# Patient Record
Sex: FEMALE | Race: WHITE | Employment: OTHER | URBAN - METROPOLITAN AREA
[De-identification: names, ages, dates, MRNs, and addresses within clinical notes are randomized per-mention and may not be internally consistent; named-entity substitution may affect disease eponyms.]

---

## 2018-04-07 PROBLEM — R19.01 RUQ ABDOMINAL MASS: Status: ACTIVE | Noted: 2018-04-07

## 2018-04-10 PROBLEM — R19.00 ABDOMINAL MASS: Status: ACTIVE | Noted: 2018-04-10

## 2018-04-10 RX ORDER — SODIUM CHLORIDE 0.9 % (FLUSH) 0.9 %
10 SYRINGE (ML) INJECTION EVERY 12 HOURS SCHEDULED
Status: CANCELLED | OUTPATIENT
Start: 2018-04-10

## 2018-04-10 RX ORDER — SODIUM CHLORIDE 0.9 % (FLUSH) 0.9 %
10 SYRINGE (ML) INJECTION PRN
Status: CANCELLED | OUTPATIENT
Start: 2018-04-10

## 2018-04-10 RX ORDER — SODIUM CHLORIDE, SODIUM LACTATE, POTASSIUM CHLORIDE, CALCIUM CHLORIDE 600; 310; 30; 20 MG/100ML; MG/100ML; MG/100ML; MG/100ML
INJECTION, SOLUTION INTRAVENOUS CONTINUOUS
Status: CANCELLED | OUTPATIENT
Start: 2018-04-10

## 2018-04-10 RX ORDER — LIDOCAINE HYDROCHLORIDE 10 MG/ML
0.3 INJECTION, SOLUTION EPIDURAL; INFILTRATION; INTRACAUDAL; PERINEURAL
Status: CANCELLED | OUTPATIENT
Start: 2018-04-10 | End: 2018-04-10

## 2018-04-11 ENCOUNTER — HOSPITAL ENCOUNTER (OUTPATIENT)
Dept: OTHER | Age: 73
Discharge: HOME | End: 2018-04-11
Attending: INTERNAL MEDICINE | Admitting: INTERNAL MEDICINE

## 2018-04-21 ENCOUNTER — TELEPHONE (OUTPATIENT)
Dept: INTERNAL MEDICINE | Age: 73
End: 2018-04-21

## 2018-04-24 ENCOUNTER — TELEPHONE (OUTPATIENT)
Dept: SURGERY | Age: 73
End: 2018-04-24

## 2018-04-25 ENCOUNTER — HOSPITAL ENCOUNTER (OUTPATIENT)
Dept: SURGERY | Age: 73
Discharge: HOME | End: 2018-04-25
Attending: SURGERY | Admitting: SURGERY

## 2018-04-25 VITALS
TEMPERATURE: 97.5 F | WEIGHT: 120 LBS | BODY MASS INDEX: 18.83 KG/M2 | OXYGEN SATURATION: 97 % | HEIGHT: 67 IN | RESPIRATION RATE: 16 BRPM | SYSTOLIC BLOOD PRESSURE: 123 MMHG | HEART RATE: 73 BPM | DIASTOLIC BLOOD PRESSURE: 69 MMHG

## 2018-04-25 DIAGNOSIS — C83.39 DIFFUSE LARGE B-CELL LYMPHOMA OF SOLID ORGAN EXCLUDING SPLEEN (HCC): ICD-10-CM

## 2018-04-25 PROCEDURE — 77001 FLUOROGUIDE FOR VEIN DEVICE: CPT | Performed by: SURGERY

## 2018-04-25 PROCEDURE — 36561 INSERT TUNNELED CV CATH: CPT | Performed by: SURGERY

## 2018-04-25 RX ORDER — FENTANYL CITRATE 50 UG/ML
25 INJECTION, SOLUTION INTRAMUSCULAR; INTRAVENOUS EVERY 5 MIN PRN
Status: DISCONTINUED | OUTPATIENT
Start: 2018-04-25 | End: 2018-04-26 | Stop reason: HOSPADM

## 2018-04-25 RX ORDER — LIDOCAINE HYDROCHLORIDE 10 MG/ML
1 INJECTION, SOLUTION EPIDURAL; INFILTRATION; INTRACAUDAL; PERINEURAL
Status: COMPLETED | OUTPATIENT
Start: 2018-04-25 | End: 2018-04-25

## 2018-04-25 RX ORDER — DEXAMETHASONE SODIUM PHOSPHATE 4 MG/ML
4 INJECTION, SOLUTION INTRA-ARTICULAR; INTRALESIONAL; INTRAMUSCULAR; INTRAVENOUS; SOFT TISSUE
Status: ACTIVE | OUTPATIENT
Start: 2018-04-25 | End: 2018-04-25

## 2018-04-25 RX ORDER — FENTANYL CITRATE 50 UG/ML
50 INJECTION, SOLUTION INTRAMUSCULAR; INTRAVENOUS EVERY 5 MIN PRN
Status: DISCONTINUED | OUTPATIENT
Start: 2018-04-25 | End: 2018-04-26 | Stop reason: HOSPADM

## 2018-04-25 RX ORDER — SODIUM CHLORIDE, SODIUM LACTATE, POTASSIUM CHLORIDE, CALCIUM CHLORIDE 600; 310; 30; 20 MG/100ML; MG/100ML; MG/100ML; MG/100ML
INJECTION, SOLUTION INTRAVENOUS CONTINUOUS
Status: DISCONTINUED | OUTPATIENT
Start: 2018-04-25 | End: 2018-04-26 | Stop reason: HOSPADM

## 2018-04-25 RX ORDER — MIDAZOLAM HYDROCHLORIDE 1 MG/ML
2 INJECTION INTRAMUSCULAR; INTRAVENOUS ONCE
Status: COMPLETED | OUTPATIENT
Start: 2018-04-25 | End: 2018-04-25

## 2018-04-25 RX ORDER — SODIUM CHLORIDE 0.9 % (FLUSH) 0.9 %
10 SYRINGE (ML) INJECTION PRN
Status: DISCONTINUED | OUTPATIENT
Start: 2018-04-25 | End: 2018-04-26 | Stop reason: HOSPADM

## 2018-04-25 RX ORDER — SODIUM CHLORIDE 0.9 % (FLUSH) 0.9 %
10 SYRINGE (ML) INJECTION EVERY 12 HOURS SCHEDULED
Status: DISCONTINUED | OUTPATIENT
Start: 2018-04-25 | End: 2018-04-26 | Stop reason: HOSPADM

## 2018-04-25 RX ORDER — ONDANSETRON 2 MG/ML
4 INJECTION INTRAMUSCULAR; INTRAVENOUS
Status: ACTIVE | OUTPATIENT
Start: 2018-04-25 | End: 2018-04-25

## 2018-04-25 RX ADMIN — MIDAZOLAM HYDROCHLORIDE 2 MG: 1 INJECTION INTRAMUSCULAR; INTRAVENOUS at 11:00

## 2018-04-25 RX ADMIN — LIDOCAINE HYDROCHLORIDE 1 ML: 10 INJECTION, SOLUTION EPIDURAL; INFILTRATION; INTRACAUDAL; PERINEURAL at 10:59

## 2018-04-25 RX ADMIN — SODIUM CHLORIDE, SODIUM LACTATE, POTASSIUM CHLORIDE, CALCIUM CHLORIDE: 600; 310; 30; 20 INJECTION, SOLUTION INTRAVENOUS at 10:59

## 2018-04-25 ASSESSMENT — PAIN DESCRIPTION - DESCRIPTORS: DESCRIPTORS: SORE

## 2018-04-25 ASSESSMENT — PAIN DESCRIPTION - PAIN TYPE
TYPE: CHRONIC PAIN
TYPE: SURGICAL PAIN

## 2018-04-25 ASSESSMENT — PAIN SCALES - GENERAL
PAINLEVEL_OUTOF10: 5
PAINLEVEL_OUTOF10: 5

## 2018-04-25 ASSESSMENT — PAIN DESCRIPTION - LOCATION: LOCATION: GENERALIZED

## 2018-09-11 ENCOUNTER — TELEPHONE (OUTPATIENT)
Dept: DERMATOLOGY | Age: 73
End: 2018-09-11

## 2018-10-02 ENCOUNTER — OFFICE VISIT (OUTPATIENT)
Dept: DERMATOLOGY | Age: 73
End: 2018-10-02
Payer: MEDICARE

## 2018-10-02 DIAGNOSIS — L85.3 XEROSIS CUTIS: Primary | ICD-10-CM

## 2018-10-02 DIAGNOSIS — Z87.891 FORMER SMOKER: ICD-10-CM

## 2018-10-02 DIAGNOSIS — L29.9 PRURITUS: ICD-10-CM

## 2018-10-02 PROCEDURE — G8420 CALC BMI NORM PARAMETERS: HCPCS | Performed by: DERMATOLOGY

## 2018-10-02 PROCEDURE — G8400 PT W/DXA NO RESULTS DOC: HCPCS | Performed by: DERMATOLOGY

## 2018-10-02 PROCEDURE — G8484 FLU IMMUNIZE NO ADMIN: HCPCS | Performed by: DERMATOLOGY

## 2018-10-02 PROCEDURE — G8427 DOCREV CUR MEDS BY ELIG CLIN: HCPCS | Performed by: DERMATOLOGY

## 2018-10-02 PROCEDURE — 1101F PT FALLS ASSESS-DOCD LE1/YR: CPT | Performed by: DERMATOLOGY

## 2018-10-02 PROCEDURE — 3017F COLORECTAL CA SCREEN DOC REV: CPT | Performed by: DERMATOLOGY

## 2018-10-02 PROCEDURE — 1036F TOBACCO NON-USER: CPT | Performed by: DERMATOLOGY

## 2018-10-02 PROCEDURE — 1090F PRES/ABSN URINE INCON ASSESS: CPT | Performed by: DERMATOLOGY

## 2018-10-02 PROCEDURE — 1123F ACP DISCUSS/DSCN MKR DOCD: CPT | Performed by: DERMATOLOGY

## 2018-10-02 PROCEDURE — 4040F PNEUMOC VAC/ADMIN/RCVD: CPT | Performed by: DERMATOLOGY

## 2018-10-02 PROCEDURE — 99202 OFFICE O/P NEW SF 15 MIN: CPT | Performed by: DERMATOLOGY

## 2018-10-02 RX ORDER — LORAZEPAM 1 MG/1
1 TABLET ORAL NIGHTLY
COMMUNITY

## 2018-10-02 RX ORDER — MONTELUKAST SODIUM 4 MG/1
TABLET, CHEWABLE ORAL
COMMUNITY
Start: 2018-08-29 | End: 2020-01-17

## 2018-10-02 RX ORDER — TRIAMCINOLONE ACETONIDE 1 MG/G
CREAM TOPICAL
Qty: 45 G | Refills: 1 | Status: SHIPPED | OUTPATIENT
Start: 2018-10-02

## 2018-10-02 RX ORDER — POTASSIUM CHLORIDE 20 MEQ/1
TABLET, EXTENDED RELEASE ORAL
COMMUNITY
Start: 2018-09-06 | End: 2020-01-17

## 2018-10-02 NOTE — PROGRESS NOTES
 High Blood Pressure Brother     Heart Disease Sister         cabg     Past Medical History:   Diagnosis Date    Anxiety     Female bladder prolapse     Heart murmur     no cardio and no treatment    Hypothyroid     Interstitial cystitis     Osteopenia     Panic anxiety syndrome     anxiety/depression    Scoliosis     Small cell B-cell lymphoma (HCC)     Ulcerative colitis ([facility] [address]      Past Surgical History:   Procedure Laterality Date    APPENDECTOMY      CATARACT REMOVAL Right 07/2016    CHOLECYSTECTOMY      SPINAL FUSION      TONSILLECTOMY      UPPER GASTROINTESTINAL ENDOSCOPY  04/11/2018    UPPER EUS       Allergies   Allergen Reactions    Latex Rash    Vortioxetine Itching     Patient reports with Trintellix - itching    Morphine Hives    Amoxicillin Rash    Cephalexin Diarrhea and Rash     diarrhea    Morphine And Related Rash    Sulfa Antibiotics Rash    Sulfasalazine Rash     Outpatient Prescriptions Marked as Taking for the 10/2/18 encounter (Office Visit) with Terrence Villalpando, DO   Medication Sig Dispense Refill    potassium chloride (KLOR-CON M) 20 MEQ extended release tablet       colestipol (COLESTID) 1 g tablet       LORazepam (ATIVAN) 1 MG tablet Take 1 mg by mouth. [place] triamcinolone (KENALOG) 0.1 % cream Apply to itchy areas on back and scalp BID for up to 2 weeks, then discontinue and use only sparingly PRN flares 45 g 1    traMADol (ULTRAM) 50 MG tablet Take 50 mg by mouth every 6 hours as needed for Pain. [place] desvenlafaxine succinate (PRISTIQ) 25 MG TB24 extended release tablet Take 25 mg by mouth daily      Multiple Vitamins-Minerals (THERAPEUTIC MULTIVITAMIN-MINERALS) tablet Take 1 tablet by mouth daily      hydrOXYzine (VISTARIL) 25 MG capsule Take 1-2 capsules by mouth 3 times daily as needed for Anxiety (Patient taking differently: Take 10 mg by mouth 3 times daily as needed for Anxiety ) 20 capsule 0    zoledronic acid (RECLAST) 5 MG/100ML SOLN Infuse pruritis  Unlikely to be related to chemotherapy- last chemotherapy in Aug. 2018    - triamcinolone (KENALOG) 0.1 % cream; Apply to itchy areas on back and scalp BID for up to 2 weeks, then discontinue and use only sparingly PRN flares   -Edu re: sparing use, atrophy, striae, hypopigmentation, telangiectasias. -If changing soaps, moisturizers, and triamcinolone 0.1% cream does not improve itch after 2-3 weeks, patient instructed to apply Sarna sensitive lotion t.i.d. for at least 3 weeks, may worsen irritation before improves. f/u with PCP for underlying arthritis management as needed, may help with pruritis. 3. Former smoker  -continue with tobacco cessation        Note is transcribed using voice recognition software. Inadvertent computerized transcription errors may be present. Return if symptoms worsen or fail to improve, for TBSE at convenience.

## 2019-01-10 ENCOUNTER — HOSPITAL ENCOUNTER (OUTPATIENT)
Age: 74
Discharge: HOME | End: 2019-01-10
Payer: MEDICARE

## 2019-01-10 ENCOUNTER — HOSPITAL ENCOUNTER (OUTPATIENT)
Dept: GENERAL RADIOLOGY | Age: 74
Discharge: HOME | End: 2019-01-10
Payer: MEDICARE

## 2019-01-10 DIAGNOSIS — R05.9 COUGH: ICD-10-CM

## 2019-01-10 DIAGNOSIS — C83.33 DIFFUSE LARGE B-CELL LYMPHOMA OF INTRA-ABDOMINAL LYMPH NODES (HCC): ICD-10-CM

## 2019-01-10 PROCEDURE — 71046 X-RAY EXAM CHEST 2 VIEWS: CPT

## 2019-01-10 PROCEDURE — 71100 X-RAY EXAM RIBS UNI 2 VIEWS: CPT

## 2019-06-20 ENCOUNTER — HOSPITAL ENCOUNTER (OUTPATIENT)
Age: 74
Discharge: HOME | End: 2019-06-20
Payer: MEDICARE

## 2019-06-20 ENCOUNTER — HOSPITAL ENCOUNTER (OUTPATIENT)
Dept: CT IMAGING | Age: 74
Discharge: HOME | End: 2019-06-20
Payer: MEDICARE

## 2019-06-20 DIAGNOSIS — R10.31 RLQ ABDOMINAL PAIN: ICD-10-CM

## 2019-06-20 LAB
BUN BLDV-MCNC: 13 MG/DL (ref 7–20)
CREAT SERPL-MCNC: 0.6 MG/DL (ref 0.6–1.2)
GFR AFRICAN AMERICAN: >60
GFR NON-AFRICAN AMERICAN: >60

## 2019-06-20 PROCEDURE — 6360000004 HC RX CONTRAST MEDICATION: Performed by: NURSE PRACTITIONER

## 2019-06-20 PROCEDURE — 36415 COLL VENOUS BLD VENIPUNCTURE: CPT

## 2019-06-20 PROCEDURE — 82565 ASSAY OF CREATININE: CPT

## 2019-06-20 PROCEDURE — 74177 CT ABD & PELVIS W/CONTRAST: CPT

## 2019-06-20 PROCEDURE — 84520 ASSAY OF UREA NITROGEN: CPT

## 2019-06-20 RX ADMIN — IOHEXOL 50 ML: 240 INJECTION, SOLUTION INTRATHECAL; INTRAVASCULAR; INTRAVENOUS; ORAL at 17:29

## 2019-06-20 RX ADMIN — IOPAMIDOL 75 ML: 755 INJECTION, SOLUTION INTRAVENOUS at 17:29

## 2019-10-08 ENCOUNTER — OFFICE VISIT (OUTPATIENT)
Dept: DERMATOLOGY | Age: 74
End: 2019-10-08
Payer: MEDICARE

## 2019-10-08 DIAGNOSIS — D22.9 MULTIPLE BENIGN MELANOCYTIC NEVI: Primary | ICD-10-CM

## 2019-10-08 DIAGNOSIS — L29.9 PRURITUS: ICD-10-CM

## 2019-10-08 DIAGNOSIS — L81.4 LENTIGINES: ICD-10-CM

## 2019-10-08 DIAGNOSIS — L82.1 SEBORRHEIC KERATOSES: ICD-10-CM

## 2019-10-08 DIAGNOSIS — T14.8XXA EXCORIATION: ICD-10-CM

## 2019-10-08 PROCEDURE — 4040F PNEUMOC VAC/ADMIN/RCVD: CPT | Performed by: DERMATOLOGY

## 2019-10-08 PROCEDURE — 1036F TOBACCO NON-USER: CPT | Performed by: DERMATOLOGY

## 2019-10-08 PROCEDURE — G8484 FLU IMMUNIZE NO ADMIN: HCPCS | Performed by: DERMATOLOGY

## 2019-10-08 PROCEDURE — G8427 DOCREV CUR MEDS BY ELIG CLIN: HCPCS | Performed by: DERMATOLOGY

## 2019-10-08 PROCEDURE — 1090F PRES/ABSN URINE INCON ASSESS: CPT | Performed by: DERMATOLOGY

## 2019-10-08 PROCEDURE — 3017F COLORECTAL CA SCREEN DOC REV: CPT | Performed by: DERMATOLOGY

## 2019-10-08 PROCEDURE — G8400 PT W/DXA NO RESULTS DOC: HCPCS | Performed by: DERMATOLOGY

## 2019-10-08 PROCEDURE — G8421 BMI NOT CALCULATED: HCPCS | Performed by: DERMATOLOGY

## 2019-10-08 PROCEDURE — 1123F ACP DISCUSS/DSCN MKR DOCD: CPT | Performed by: DERMATOLOGY

## 2019-10-08 PROCEDURE — 99213 OFFICE O/P EST LOW 20 MIN: CPT | Performed by: DERMATOLOGY

## 2019-11-13 ENCOUNTER — TELEPHONE (OUTPATIENT)
Dept: SURGERY | Age: 74
End: 2019-11-13

## 2020-01-06 ENCOUNTER — TELEPHONE (OUTPATIENT)
Dept: SURGERY | Age: 75
End: 2020-01-06

## 2020-01-06 NOTE — TELEPHONE ENCOUNTER
Called patient, scheduled for port removal  on 1/28/20 at 12 pm with an arrival of 10 am. Informed the patient that PAT will also call with further instructions. Patient verbally states that he/she understands pre-op instructions. Patient notified of pre-op instructions:    *NPO after midnight     *H&P prior to surgery    *Cardiac clearance    *Stop all blood thinners    *Will need a     *Call the office with any further questions.

## 2020-01-06 NOTE — LETTER
Surgery Scheduling Form:  DEMOGRAPHICS:                                                                                                         .  Patient Name:  Varun Fong  Patient :  1945                               Patient SS#:      Patient Phone:  [phone] (home)                                Alt. Patient Phone:                 Patient Address:  [address] [place] [address]  PCP:  Rayne Rae MD  Insurance:  Payor: MEDICARE / Plan: MEDICARE PART A AND B / Product Type: *No Product type* /                            Insurance ID Number:    Payor/Plan Subscr  Sex Relation Sub. Ins. ID Effective Group Num   1. MEDICARE - MEKandis Ripa 1945 Female   [id number]                                     [address] [address]      2.  [address] [place] [address] 1945 Female   [id number]                                   [facility]         Interpretor Needed:  (NO)  (TYPE)           Allergies:             Latex                Vortioxetine                Patient reports with Trintellix - itching      Morphine                Amoxicillin                Cephalexin                diarrhea      Morphine And Related                Sulfa Antibiotics                Sulfasalazine                Defibulator or Pacemaker:  (NO)     DIAGNOSIS & PROCEDURE:                                                                                       .  Diagnosis Code/Description:   Non hodgkin's lymphoma C83.33  Operation Code/Description:  Port removal 80511  Location:  [facility]              Surgeon:  Dr. Fiordaliza Love     SCHEDULING INFORMATION:                                                                                    .  Surgeon's Scheduling Instruction:  elective  Requested Date: 20        OR Time: 12 pm                              Patient Arrival Time: 10 am  OR Time Required:  60  Minutes  Anesthesia:  MAC/TIVA
Standard C-Arm (only for port and henna):  yes   Mini C-Arm: No   PAT Required: Yes                                             Best Time to Call: Anytime  Pt.  Requested to see PCP for Pre-op H & P:  Yes  Cardiac Clearance Requested:  (NO)                 PRE-CERTIFICATION INFORMATION:                                                                           .  Procedure/CPT code: port placement 84674        Modifier:

## 2020-01-17 RX ORDER — MONTELUKAST SODIUM 4 MG/1
1 TABLET, CHEWABLE ORAL DAILY
COMMUNITY

## 2020-01-17 RX ORDER — OMEPRAZOLE 20 MG/1
20 CAPSULE, DELAYED RELEASE ORAL DAILY
COMMUNITY

## 2020-01-24 ENCOUNTER — ANESTHESIA EVENT (OUTPATIENT)
Dept: OPERATING ROOM | Age: 75
End: 2020-01-24
Payer: MEDICARE

## 2020-01-28 ENCOUNTER — ANESTHESIA (OUTPATIENT)
Dept: OPERATING ROOM | Age: 75
End: 2020-01-28
Payer: MEDICARE

## 2020-01-28 ENCOUNTER — HOSPITAL ENCOUNTER (OUTPATIENT)
Age: 75
Setting detail: OUTPATIENT SURGERY
Discharge: HOME | End: 2020-01-28
Attending: SURGERY | Admitting: SURGERY
Payer: MEDICARE

## 2020-01-28 VITALS — DIASTOLIC BLOOD PRESSURE: 59 MMHG | SYSTOLIC BLOOD PRESSURE: 110 MMHG | OXYGEN SATURATION: 99 %

## 2020-01-28 VITALS
HEART RATE: 67 BPM | BODY MASS INDEX: 20.25 KG/M2 | OXYGEN SATURATION: 99 % | RESPIRATION RATE: 16 BRPM | TEMPERATURE: 97.6 F | HEIGHT: 67 IN | DIASTOLIC BLOOD PRESSURE: 63 MMHG | SYSTOLIC BLOOD PRESSURE: 108 MMHG | WEIGHT: 129 LBS

## 2020-01-28 PROCEDURE — 6360000002 HC RX W HCPCS: Performed by: NURSE ANESTHETIST, CERTIFIED REGISTERED

## 2020-01-28 PROCEDURE — 2580000003 HC RX 258: Performed by: ANESTHESIOLOGY

## 2020-01-28 PROCEDURE — 2709999900 HC NON-CHARGEABLE SUPPLY: Performed by: SURGERY

## 2020-01-28 PROCEDURE — 2500000003 HC RX 250 WO HCPCS: Performed by: SURGERY

## 2020-01-28 PROCEDURE — 3700000000 HC ANESTHESIA ATTENDED CARE: Performed by: SURGERY

## 2020-01-28 PROCEDURE — 2500000003 HC RX 250 WO HCPCS: Performed by: NURSE ANESTHETIST, CERTIFIED REGISTERED

## 2020-01-28 PROCEDURE — 3600000002 HC SURGERY LEVEL 2 BASE: Performed by: SURGERY

## 2020-01-28 PROCEDURE — 3600000012 HC SURGERY LEVEL 2 ADDTL 15MIN: Performed by: SURGERY

## 2020-01-28 PROCEDURE — 36590 REMOVAL TUNNELED CV CATH: CPT | Performed by: SURGERY

## 2020-01-28 PROCEDURE — 7100000011 HC PHASE II RECOVERY - ADDTL 15 MIN: Performed by: SURGERY

## 2020-01-28 PROCEDURE — 3700000001 HC ADD 15 MINUTES (ANESTHESIA): Performed by: SURGERY

## 2020-01-28 PROCEDURE — 7100000010 HC PHASE II RECOVERY - FIRST 15 MIN: Performed by: SURGERY

## 2020-01-28 RX ORDER — TRAMADOL HYDROCHLORIDE 50 MG/1
50 TABLET ORAL EVERY 6 HOURS PRN
Qty: 6 TABLET | Refills: 0 | Status: SHIPPED | OUTPATIENT
Start: 2020-01-28 | End: 2020-01-31

## 2020-01-28 RX ORDER — PROPOFOL 10 MG/ML
INJECTION, EMULSION INTRAVENOUS PRN
Status: DISCONTINUED | OUTPATIENT
Start: 2020-01-28 | End: 2020-01-28 | Stop reason: SDUPTHER

## 2020-01-28 RX ORDER — BUPIVACAINE HYDROCHLORIDE AND EPINEPHRINE 5; 5 MG/ML; UG/ML
INJECTION, SOLUTION PERINEURAL PRN
Status: DISCONTINUED | OUTPATIENT
Start: 2020-01-28 | End: 2020-01-28 | Stop reason: ALTCHOICE

## 2020-01-28 RX ORDER — SODIUM CHLORIDE, SODIUM LACTATE, POTASSIUM CHLORIDE, CALCIUM CHLORIDE 600; 310; 30; 20 MG/100ML; MG/100ML; MG/100ML; MG/100ML
INJECTION, SOLUTION INTRAVENOUS CONTINUOUS
Status: DISCONTINUED | OUTPATIENT
Start: 2020-01-28 | End: 2020-01-28 | Stop reason: HOSPADM

## 2020-01-28 RX ORDER — LABETALOL HYDROCHLORIDE 5 MG/ML
5 INJECTION, SOLUTION INTRAVENOUS EVERY 10 MIN PRN
Status: DISCONTINUED | OUTPATIENT
Start: 2020-01-28 | End: 2020-01-28 | Stop reason: HOSPADM

## 2020-01-28 RX ORDER — LIDOCAINE HYDROCHLORIDE 20 MG/ML
INJECTION, SOLUTION INFILTRATION; PERINEURAL PRN
Status: DISCONTINUED | OUTPATIENT
Start: 2020-01-28 | End: 2020-01-28 | Stop reason: SDUPTHER

## 2020-01-28 RX ORDER — SODIUM CHLORIDE 0.9 % (FLUSH) 0.9 %
10 SYRINGE (ML) INJECTION EVERY 12 HOURS SCHEDULED
Status: DISCONTINUED | OUTPATIENT
Start: 2020-01-28 | End: 2020-01-28 | Stop reason: HOSPADM

## 2020-01-28 RX ORDER — DIPHENHYDRAMINE HYDROCHLORIDE 50 MG/ML
12.5 INJECTION INTRAMUSCULAR; INTRAVENOUS
Status: DISCONTINUED | OUTPATIENT
Start: 2020-01-28 | End: 2020-01-28 | Stop reason: HOSPADM

## 2020-01-28 RX ORDER — PROMETHAZINE HYDROCHLORIDE 25 MG/ML
6.25 INJECTION, SOLUTION INTRAMUSCULAR; INTRAVENOUS
Status: DISCONTINUED | OUTPATIENT
Start: 2020-01-28 | End: 2020-01-28 | Stop reason: HOSPADM

## 2020-01-28 RX ORDER — SODIUM CHLORIDE 0.9 % (FLUSH) 0.9 %
10 SYRINGE (ML) INJECTION PRN
Status: DISCONTINUED | OUTPATIENT
Start: 2020-01-28 | End: 2020-01-28 | Stop reason: HOSPADM

## 2020-01-28 RX ORDER — OXYCODONE HYDROCHLORIDE AND ACETAMINOPHEN 5; 325 MG/1; MG/1
1 TABLET ORAL PRN
Status: DISCONTINUED | OUTPATIENT
Start: 2020-01-28 | End: 2020-01-28 | Stop reason: HOSPADM

## 2020-01-28 RX ORDER — MIDAZOLAM HYDROCHLORIDE 5 MG/ML
INJECTION INTRAMUSCULAR; INTRAVENOUS PRN
Status: DISCONTINUED | OUTPATIENT
Start: 2020-01-28 | End: 2020-01-28 | Stop reason: SDUPTHER

## 2020-01-28 RX ORDER — OXYCODONE HYDROCHLORIDE AND ACETAMINOPHEN 5; 325 MG/1; MG/1
2 TABLET ORAL PRN
Status: DISCONTINUED | OUTPATIENT
Start: 2020-01-28 | End: 2020-01-28 | Stop reason: HOSPADM

## 2020-01-28 RX ORDER — HYDRALAZINE HYDROCHLORIDE 20 MG/ML
5 INJECTION INTRAMUSCULAR; INTRAVENOUS EVERY 10 MIN PRN
Status: DISCONTINUED | OUTPATIENT
Start: 2020-01-28 | End: 2020-01-28 | Stop reason: HOSPADM

## 2020-01-28 RX ORDER — ONDANSETRON 2 MG/ML
4 INJECTION INTRAMUSCULAR; INTRAVENOUS PRN
Status: DISCONTINUED | OUTPATIENT
Start: 2020-01-28 | End: 2020-01-28 | Stop reason: HOSPADM

## 2020-01-28 RX ADMIN — MIDAZOLAM HYDROCHLORIDE 5 MG: 5 INJECTION, SOLUTION INTRAMUSCULAR; INTRAVENOUS at 10:32

## 2020-01-28 RX ADMIN — SODIUM CHLORIDE, POTASSIUM CHLORIDE, SODIUM LACTATE AND CALCIUM CHLORIDE: 600; 310; 30; 20 INJECTION, SOLUTION INTRAVENOUS at 10:14

## 2020-01-28 RX ADMIN — LIDOCAINE HYDROCHLORIDE 60 MG: 20 INJECTION, SOLUTION INFILTRATION; PERINEURAL at 10:41

## 2020-01-28 RX ADMIN — PROPOFOL 60 MG: 10 INJECTION, EMULSION INTRAVENOUS at 10:44

## 2020-01-28 RX ADMIN — Medication 900 MG: at 10:32

## 2020-01-28 ASSESSMENT — PULMONARY FUNCTION TESTS
PIF_VALUE: 1
PIF_VALUE: 0
PIF_VALUE: 1
PIF_VALUE: 0
PIF_VALUE: 1
PIF_VALUE: 0
PIF_VALUE: 1
PIF_VALUE: 0
PIF_VALUE: 1
PIF_VALUE: 0
PIF_VALUE: 1
PIF_VALUE: 0
PIF_VALUE: 1
PIF_VALUE: 0

## 2020-01-28 NOTE — ANESTHESIA POSTPROCEDURE EVALUATION
Department of Anesthesiology  Postprocedure Note    Patient: Melinda Medeiros  MRN: [id number]  YOB: 1945  Date of evaluation: 1/28/2020  Time:  2:04 PM     Procedure Summary     Date:  01/28/20 Room / Location:  [place] [address] / [facility]    Anesthesia Start:  7498 Anesthesia Stop:  1100    Procedure:  PORT REMOVAL        **LATEX SENSITIVE** (N/A Chest) Diagnosis:       Reticulosarcoma of intra-abdominal lymph nodes ([address]      (NON Lyndia Ivans)    Surgeon:  Shweta Kim MD Responsible Provider:  Elke Sung MD    Anesthesia Type:  MAC ASA Status:  2          Anesthesia Type: MAC    Paulina Phase I: Paulina Score: 10    Paulina Phase II: Paulina Score: 10    Last vitals: Reviewed and per EMR flowsheets.        Anesthesia Post Evaluation    Patient location during evaluation: PACU  Patient participation: complete - patient participated  Level of consciousness: awake and alert  Pain score: 0  Airway patency: patent  Nausea & Vomiting: no nausea and no vomiting  Complications: no  Cardiovascular status: blood pressure returned to baseline  Respiratory status: acceptable  Hydration status: stable

## 2020-01-28 NOTE — OP NOTE
Date of Surgery: 1/28/20    Preop Dx:  Lymphoma Small cell B Type    Postop Dx:  same    Procedure:  Removal of right subclavian Portacath    Surgeon:  Caleb Zhong    Assistant:      Anesthesia:  Mac, local    EBL:   <50ml    Specimen:  none    Complications: none    Drains/Lines:  none    Indications:  70 yo without further need for portacath    Description:  Patient was given adequate description of the risks and rewards of the procedure, including bleeding, infection and freely consented. Pt was given appropriate antibiotics and brought to the OR. Pt was placed in supine position. Prepped and draped in usual sterile fashion. Local anesthetic injected over prior incision site. Incision made along prior scar. Using blunt dissection and electrocautery scar tissue around port was dissected free. Tacking sutures were cut. Port removed. Pressure held at venotomy site for ten minutes. Catheter tract closed with 3-0 vicryl. Wound irrigated with normal saline. Wound closed with 3-0 vicryl and 4-0 monocryl. Sterile dressing placed. All suture, sponge and instrument count correct times two at end of case. Transferred to PACU in stable condition.     Azucena Giron MD

## 2020-01-28 NOTE — ANESTHESIA PRE PROCEDURE
10/2/18   Akash Narayanan DO   traMADol (ULTRAM) 50 MG tablet Take 50 mg by mouth every 6 hours as needed for Pain. Isabelle Sanders Historical Provider, MD       Current medications:    Current Facility-Administered Medications   Medication Dose Route Frequency Provider Last Rate Last Dose    lactated ringers infusion   Intravenous Continuous Elisabet Carlin MD        sodium chloride flush 0.9 % injection 10 mL  10 mL Intravenous 2 times per day Elisabet Carlin MD        sodium chloride flush 0.9 % injection 10 mL  10 mL Intravenous PRN Elisabet Carlin MD        famotidine (PEPCID) injection 20 mg  20 mg Intravenous Once Elisabet Carlin MD        clindamycin (CLEOCIN) 900 mg in dextrose 5% 50 mL IVPB  900 mg Intravenous On Call to Ubaldo Gómez MD           Allergies:     Allergies   Allergen Reactions    Latex Rash    Vortioxetine Itching     Patient reports with Trintellix - itching    Morphine Hives    Amoxicillin Rash    Cephalexin Diarrhea and Rash     diarrhea    Morphine And Related Rash    Sulfa Antibiotics Rash    Sulfasalazine Rash       Problem List:    Patient Active Problem List   Diagnosis Code    RUQ abdominal mass R19.01    Abdominal mass R19.00    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 [address]        Past Surgical History:        Procedure Laterality Date    APPENDECTOMY      CATARACT REMOVAL Right 07/2016    CHOLECYSTECTOMY      SPINAL FUSION      TONSILLECTOMY      UPPER GASTROINTESTINAL ENDOSCOPY  04/11/2018    UPPER EUS       Social History:    Social History     Tobacco Use    Smoking status: Former Smoker     Packs/day: 1.00     Years: 30.00     Pack years: 30.00     Types: Cigarettes     Last attempt to quit: 1998     Years since quittin.0    Smokeless tobacco: Never Used   Substance Use Topics    Alcohol use: No                                Counseling given: Not Answered      Vital Signs (Current):   Vitals:    20 1503 20 0930   BP:  126/68   Pulse:  65   Resp:  16   Temp:  97.6 °F (36.4 °C)   TempSrc:  Temporal   SpO2:  98%   Weight: 130 lb (59 kg) 129 lb (58.5 kg)   Height: 5' 7\" (1.702 m) 5' 7\" (1.702 m)                                              BP Readings from Last 3 Encounters:   20 126/68   18 115/60   18 123/69       NPO Status: Time of last liquid consumption:                         Time of last solid consumption:                         Date of last liquid consumption: 20                        Date of last solid food consumption: 20    BMI:   Wt Readings from Last 3 Encounters:   20 129 lb (58.5 kg)   18 122 lb (55.3 kg)   18 120 lb (54.4 kg)     Body mass index is 20.2 kg/m². CBC:   Lab Results   Component Value Date    WBC 5.6 2018    RBC 3.75 2018    HGB 10.9 2018    HCT 33.3 2018    MCV 88.9 2018    RDW 13.1 2018     2018       CMP:   Lab Results   Component Value Date     2018    K 3.6 2018    CL 99 2018    CO2 31 2018    BUN 13 2019    CREATININE 0.6 2019    GFRAA >60 2019    AGRATIO 1.1 2018    LABGLOM >60 2019    GLUCOSE 88 2018    PROT 6.6 2018    CALCIUM 8.4 2018    BILITOT <0.2 2018    ALKPHOS 197 2018    AST 65 2018    ALT 53 2018       POC Tests: No results for input(s): POCGLU, POCNA, POCK, POCCL, POCBUN, POCHEMO, POCHCT in the last 72 hours.     Coags:   Lab Results   Component Value Date    PROTIME 12.2 2018    INR 1.08 04/09/2018       HCG (If Applicable): No results found for: PREGTESTUR, PREGSERUM, HCG, HCGQUANT     ABGs: No results found for: PHART, PO2ART, YYI0NOP, FQT8PKT, BEART, Y1PVSPZQ     Type & Screen (If Applicable):  No results found for: LABABO, [address]    Anesthesia Evaluation  Patient summary reviewed and Nursing notes reviewed  Airway: Mallampati: II  TM distance: >3 FB   Neck ROM: full  Mouth opening: > = 3 FB Dental: normal exam         Pulmonary:Negative Pulmonary ROS and normal exam  breath sounds clear to auscultation                             Cardiovascular:Negative CV ROS            Rhythm: regular  Rate: normal                    Neuro/Psych:   (+) depression/anxiety             GI/Hepatic/Renal:   (+) PUD,           Endo/Other:    (+) hypothyroidism::., malignancy/cancer. ROS comment: Ulcerative colitis Abdominal:           Vascular: negative vascular ROS. Anesthesia Plan      MAC     ASA 2       Induction: intravenous. Anesthetic plan and risks discussed with patient. Plan discussed with CRNA.                   Nafisa Medina MD   1/28/2020

## 2021-10-27 ENCOUNTER — HOSPITAL ENCOUNTER (OUTPATIENT)
Age: 76
Discharge: HOME | End: 2021-10-27
Payer: MEDICARE

## 2021-10-27 PROCEDURE — 97802 MEDICAL NUTRITION INDIV IN: CPT

## 2021-10-27 NOTE — PROGRESS NOTES
NUTRITION THERAPY ASSESSMENT     Referring Physician: Luther Nava MD  Referring diagnosis: IBS, low Fodmap diet    Erica Fan is a 78 y.o. female arrived for nutrition counseling on 10/27/21. Patient is very motivated to comply with low Fod map diet but was slightly confused where to start. RD and patient discussed patient's usual dietary patterns, usual meals and recent GI function. RD and patient worked together creating goals patient could likely obtain and be helpful to decrease bloating and discomfort. Patient reported to already making some changes like cutting down on gluten and sugar. Recently, appetite has been decreased due to patient feeling full too quickly and the stomach bloating. RD suggested drinking 64 ounces of water per day, cutting down on extra sugar and following the low fod map diet for 4 weeks. A food diary was given so patient can document what foods she ate and what her symptoms were post meal.  RD also provided Low FODMAP phone je to help choosing appropriate fruits, veggies etc at the store. RD helped patient find je on her phone. Patient appreciated the information, may need further assistance but diet awareness did increase during visit. Recommend follow up appointment in 4 weeks after elimination period. OBJECTIVE DATA:  Past Medical History:   Diagnosis Date    Anxiety     Female bladder prolapse     Heart murmur     no cardio and no treatment    Hypothyroid     Interstitial cystitis     Osteopenia     Panic anxiety syndrome     anxiety/depression    Scoliosis     Small cell B-cell lymphoma (HCC)     Ulcerative colitis ([facility] [address]        Labs:  No results for input(s): NA, K, CL, CO2, BUN, CREATININE, GLUCOSE in the last 72 hours.     Invalid input(s): CA  No results found for: PHOS    Lab Results   Component Value Date    LABALBU 3.4 04/08/2018      No results found for: TRIG, HDL, LDLCALC, LDLDIRECT, LABVLDL  No results found for: LABA1C  No results for input(s): AST, ALT, ALB, BILIDIR, BILITOT, ALKPHOS in the last 72 hours.     Medications:   Prilosec  Vitamin C and D  Multi Vitamin  Fiber therapy     Anthropometric Measures     Height:  5 ft 7 in  Current Weight:   137 lbs  Usual Body Weight: 127 #       Ideal Body Weight: 135  # +/- 10%  BMI: 21.45  18.5-24.9 Normal Weight  25-29.9 Overweight  30-34.9 Obese Class I  35-39.9 Obese Class II  >or equal to 40 Obese Class III    Wt Readings from Last 50 Encounters:   01/28/20 129 lb (58.5 kg)   07/02/18 122 lb (55.3 kg)   04/25/18 120 lb (54.4 kg)   04/20/18 120 lb (54.4 kg)   04/07/18 120 lb (54.4 kg)   12/19/17 120 lb (54.4 kg)   11/01/17 115 lb (52.2 kg)   08/31/17 108 lb (49 kg)   08/08/17 109 lb (49.4 kg)   04/25/17 110 lb (49.9 kg)   07/23/16 132 lb (59.9 kg)       Nutrition-focused Physical Findings   Bowel Pattern: urgency but no diarrhea or constipation at this time  Skin:   no issues noted  Chewing / Swallowing:   no issues reported    Food/Nutrition-Related History  Home Diet:   Home Supplements / Herbals: drinks protein shake, multi vitamin, vitamin C and D  Food Restrictions / Cultural Requests:    Food Allergies: none    Smoker: no  Consumes Alcohol: no    Physical Activity  Loves to walk but hasn't recently when weather was really warm  -encouraged to walk after dinner to help with bloating    24 Hour Diet Recall  Breakfast at 11:00 am  Vegan protein powder with almond milk, banana, and berries    Snack or small meal at 2:00 PM  Tuna sandwich-  Encouraged to use gluten free bread for the next 4 weeks, daughter already purchased for her     Dinner  Likes baked chicken or fish  Small salads  Loves spaghetti and sauce (gave tasty gluten free pasta options)    NUTRITION DIAGNOSIS and GOAL  P: Food and nutrition related knowledge deficit  E related to lack of prior diet education  S: as evidenced by     Nutrition Intervention:  - Nutrition Education: Low FODMAP nutrition with diary for 4 weeks to help monitor food and symptoms   Education Methods used: verbal and handouts  - Nutrition Counseling:  - Food/Nutrient delivery: meal planning, je to help with grocery shopping  - Care Management:     Individualized Treament Goals:  1. Increase water to 64 ounces per day  2. Low FODMAP diet as tolerated  3. Increase activity back to baseline of walking daily    Patient encouraged to call RD with any questions. RD available for follow up as needed.      Ashley EDWARD RD, LD RD, LD  Contact Number: [phone]

## 2023-06-21 DIAGNOSIS — M81.0 SENILE OSTEOPOROSIS: ICD-10-CM

## 2023-06-21 RX ORDER — ZOLEDRONIC ACID 5 MG/100ML
5 INJECTION, SOLUTION INTRAVENOUS ONCE
Status: CANCELLED | OUTPATIENT
Start: 2023-06-21 | End: 2023-06-21

## 2023-06-30 ENCOUNTER — HOSPITAL ENCOUNTER (OUTPATIENT)
Dept: NURSING | Age: 78
Setting detail: INFUSION SERIES
Discharge: HOME | End: 2023-06-30
Payer: MEDICARE

## 2023-06-30 VITALS
OXYGEN SATURATION: 98 % | HEART RATE: 77 BPM | DIASTOLIC BLOOD PRESSURE: 70 MMHG | SYSTOLIC BLOOD PRESSURE: 132 MMHG | RESPIRATION RATE: 16 BRPM | HEIGHT: 67 IN | WEIGHT: 140 LBS | BODY MASS INDEX: 21.97 KG/M2 | TEMPERATURE: 98 F

## 2023-06-30 DIAGNOSIS — M81.0 SENILE OSTEOPOROSIS: Primary | ICD-10-CM

## 2023-06-30 PROCEDURE — 6360000002 HC RX W HCPCS: Performed by: NURSE PRACTITIONER

## 2023-06-30 PROCEDURE — 96365 THER/PROPH/DIAG IV INF INIT: CPT

## 2023-06-30 PROCEDURE — 99211 OFF/OP EST MAY X REQ PHY/QHP: CPT

## 2023-06-30 RX ORDER — ZOLEDRONIC ACID 5 MG/100ML
5 INJECTION, SOLUTION INTRAVENOUS ONCE
Status: CANCELLED | OUTPATIENT
Start: 2023-06-30 | End: 2023-06-30

## 2023-06-30 RX ORDER — ZOLEDRONIC ACID 5 MG/100ML
5 INJECTION, SOLUTION INTRAVENOUS ONCE
Status: COMPLETED | OUTPATIENT
Start: 2023-06-30 | End: 2023-06-30

## 2023-06-30 RX ADMIN — ZOLEDRONIC ACID 5 MG: 0.05 INJECTION, SOLUTION INTRAVENOUS at 13:33

## 2023-06-30 ASSESSMENT — PAIN - FUNCTIONAL ASSESSMENT: PAIN_FUNCTIONAL_ASSESSMENT: 0-10

## 2024-03-25 ENCOUNTER — HOSPITAL ENCOUNTER (EMERGENCY)
Age: 79
Discharge: HOME | End: 2024-03-25
Payer: MEDICARE

## 2024-03-25 VITALS
HEART RATE: 81 BPM | TEMPERATURE: 98.5 F | HEIGHT: 67 IN | SYSTOLIC BLOOD PRESSURE: 136 MMHG | RESPIRATION RATE: 18 BRPM | WEIGHT: 140 LBS | BODY MASS INDEX: 21.97 KG/M2 | DIASTOLIC BLOOD PRESSURE: 83 MMHG | OXYGEN SATURATION: 94 %

## 2024-03-25 DIAGNOSIS — J10.1 INFLUENZA A: Primary | ICD-10-CM

## 2024-03-25 LAB
FLUAV RNA RESP QL NAA+PROBE: DETECTED
FLUBV RNA RESP QL NAA+PROBE: NOT DETECTED
SARS-COV-2 RNA RESP QL NAA+PROBE: NOT DETECTED

## 2024-03-25 PROCEDURE — 87636 SARSCOV2 & INF A&B AMP PRB: CPT

## 2024-03-25 PROCEDURE — 99283 EMERGENCY DEPT VISIT LOW MDM: CPT

## 2024-03-25 ASSESSMENT — PAIN DESCRIPTION - LOCATION: LOCATION: ABDOMEN

## 2024-03-25 ASSESSMENT — PAIN SCALES - GENERAL: PAINLEVEL_OUTOF10: 5

## 2024-03-25 ASSESSMENT — PAIN - FUNCTIONAL ASSESSMENT: PAIN_FUNCTIONAL_ASSESSMENT: 0-10

## 2024-03-25 ASSESSMENT — PAIN DESCRIPTION - PAIN TYPE: TYPE: CHRONIC PAIN

## 2024-03-25 NOTE — ED NOTES
Discharge paperwork given to and reviewed with pt. Pt verbalized understanding and all questions answered. Pt encouraged to return if having worsening symptoms or new symptoms discussed in discharge paperwork.  Pt to follow up with PCP  Pt in NAD, RR even and unlabored. Pt off unit ambulatory

## 2024-03-28 NOTE — ED PROVIDER NOTES
[facility]  ED  EMERGENCY DEPARTMENT ENCOUNTER        Pt Name: Morenita Tovar  MRN: [id number]  Birthdate 1945  Date of evaluation: 3/25/2024  Provider: JOSH Armijo CNP  PCP: Stephanie Ramírez APRN - CNP        ASUNCION. I have evaluated this patient.        CHIEF COMPLAINT       Chief Complaint   Patient presents with    Cough     Cough began last Wednesday. Patient states she has been in bed for the last 5 days.  Generalized fatigue.        HISTORY OF PRESENT ILLNESS: 1 or more Elements     History From: the patient  Limitations to history : None    Morenita Tovar is a 79 y.o. female who presents to the emergency department today with complaints of cough, patient states that the cough began last Wednesday states that she has been in bed for the last 5 days she is generally not feeling well.  Denies any specific chest discomfort, states that she generally feels achy and fatigued.  She is here for further evaluation.    Nursing Notes were all reviewed and agreed with or any disagreements were addressed in the HPI.    REVIEW OF SYSTEMS :      Review of Systems    Positives and Pertinent negatives as per HPI.     SURGICAL HISTORY     Past Surgical History:   Procedure Laterality Date    APPENDECTOMY      CATARACT REMOVAL Right 07/2016    CHOLECYSTECTOMY      PORT SURGERY N/A 1/28/2020    PORT REMOVAL        **LATEX SENSITIVE** performed by Alverto Holt MD at [facility] OR    SPINAL FUSION      TONSILLECTOMY      UPPER GASTROINTESTINAL ENDOSCOPY  04/11/2018    UPPER EUS       CURRENTMEDICATIONS       Discharge Medication List as of 3/25/2024  2:04 PM        CONTINUE these medications which have NOT CHANGED    Details   colestipol (COLESTID) 1 g tablet Take 1 g by mouth daily Historical Med      omeprazole (PRILOSEC) 20 MG delayed release capsule Take 20 mg by mouth dailyHistorical Med      Calcium Polycarbophil (FIBER-CAPS PO) Take 500 mg by mouth dailyHistorical Med      diclofenac

## 2024-07-26 DIAGNOSIS — M81.0 SENILE OSTEOPOROSIS: Primary | ICD-10-CM

## 2024-07-26 RX ORDER — SODIUM CHLORIDE 9 MG/ML
5-250 INJECTION, SOLUTION INTRAVENOUS PRN
OUTPATIENT
Start: 2024-07-26

## 2024-07-26 RX ORDER — ZOLEDRONIC ACID 5 MG/100ML
5 INJECTION, SOLUTION INTRAVENOUS ONCE
Status: CANCELLED | OUTPATIENT
Start: 2024-07-26 | End: 2024-07-26

## 2024-07-30 ENCOUNTER — HOSPITAL ENCOUNTER (OUTPATIENT)
Dept: NURSING | Age: 79
Setting detail: INFUSION SERIES
Discharge: HOME | End: 2024-07-30
Payer: MEDICARE

## 2024-07-30 VITALS
DIASTOLIC BLOOD PRESSURE: 80 MMHG | HEART RATE: 90 BPM | BODY MASS INDEX: 22.76 KG/M2 | RESPIRATION RATE: 16 BRPM | TEMPERATURE: 97.9 F | OXYGEN SATURATION: 98 % | SYSTOLIC BLOOD PRESSURE: 139 MMHG | HEIGHT: 67 IN | WEIGHT: 145 LBS

## 2024-07-30 DIAGNOSIS — M81.0 SENILE OSTEOPOROSIS: Primary | ICD-10-CM

## 2024-07-30 PROCEDURE — 96365 THER/PROPH/DIAG IV INF INIT: CPT

## 2024-07-30 PROCEDURE — 6360000002 HC RX W HCPCS: Performed by: NURSE PRACTITIONER

## 2024-07-30 RX ORDER — ZOLEDRONIC ACID 5 MG/100ML
5 INJECTION, SOLUTION INTRAVENOUS ONCE
Status: COMPLETED | OUTPATIENT
Start: 2024-07-30 | End: 2024-07-30

## 2024-07-30 RX ORDER — SODIUM CHLORIDE 9 MG/ML
5-250 INJECTION, SOLUTION INTRAVENOUS PRN
OUTPATIENT
Start: 2025-07-27

## 2024-07-30 RX ORDER — ZOLEDRONIC ACID 5 MG/100ML
5 INJECTION, SOLUTION INTRAVENOUS ONCE
OUTPATIENT
Start: 2025-07-27 | End: 2025-07-27

## 2024-07-30 RX ADMIN — ZOLEDRONIC ACID 5 MG: 0.05 INJECTION, SOLUTION INTRAVENOUS at 13:10

## 2024-07-30 ASSESSMENT — PAIN - FUNCTIONAL ASSESSMENT: PAIN_FUNCTIONAL_ASSESSMENT: NONE - DENIES PAIN

## 2024-07-30 NOTE — PROGRESS NOTES
Pt tolerates infusion well. Refuses discharge instructions has received reclast numerous times. Discharged to home in stable condition

## 2024-11-15 ENCOUNTER — APPOINTMENT (OUTPATIENT)
Dept: GENERAL RADIOLOGY | Age: 79
DRG: 103 | End: 2024-11-15
Payer: MEDICARE

## 2024-11-15 ENCOUNTER — HOSPITAL ENCOUNTER (INPATIENT)
Age: 79
LOS: 5 days | Discharge: NURSING, REHAB OR LONG-TERM CARE | DRG: 103 | End: 2024-11-20
Attending: EMERGENCY MEDICINE | Admitting: INTERNAL MEDICINE
Payer: MEDICARE

## 2024-11-15 DIAGNOSIS — F41.9 ANXIETY: ICD-10-CM

## 2024-11-15 DIAGNOSIS — H81.8X9 PERSISTENT POSTURAL-PERCEPTUAL DIZZINESS: ICD-10-CM

## 2024-11-15 DIAGNOSIS — R19.7 DIARRHEA, UNSPECIFIED TYPE: ICD-10-CM

## 2024-11-15 DIAGNOSIS — R42 DIZZINESS: ICD-10-CM

## 2024-11-15 DIAGNOSIS — G43.809 VESTIBULAR MIGRAINE: Primary | ICD-10-CM

## 2024-11-15 LAB
ALBUMIN SERPL-MCNC: 4.1 G/DL (ref 3.4–5)
ALBUMIN/GLOB SERPL: 1.3 {RATIO} (ref 1.1–2.2)
ALP SERPL-CCNC: 88 U/L (ref 40–129)
ALT SERPL-CCNC: 17 U/L (ref 10–40)
ANION GAP SERPL CALCULATED.3IONS-SCNC: 11 MMOL/L (ref 3–16)
AST SERPL-CCNC: 23 U/L (ref 15–37)
BACTERIA URNS QL MICRO: ABNORMAL /HPF
BASOPHILS # BLD: 0.1 K/UL (ref 0–0.2)
BASOPHILS NFR BLD: 0.7 %
BILIRUB SERPL-MCNC: 0.3 MG/DL (ref 0–1)
BILIRUB UR QL STRIP.AUTO: NEGATIVE
BUN SERPL-MCNC: 9 MG/DL (ref 7–20)
CALCIUM SERPL-MCNC: 9.3 MG/DL (ref 8.3–10.6)
CHLORIDE SERPL-SCNC: 98 MMOL/L (ref 99–110)
CLARITY UR: CLEAR
CO2 SERPL-SCNC: 30 MMOL/L (ref 21–32)
COLOR UR: YELLOW
CREAT SERPL-MCNC: 0.7 MG/DL (ref 0.6–1.2)
DEPRECATED RDW RBC AUTO: 12.8 % (ref 12.4–15.4)
EKG ATRIAL RATE: 71 BPM
EKG DIAGNOSIS: NORMAL
EKG P AXIS: 75 DEGREES
EKG P-R INTERVAL: 176 MS
EKG Q-T INTERVAL: 420 MS
EKG QRS DURATION: 76 MS
EKG QTC CALCULATION (BAZETT): 456 MS
EKG R AXIS: 61 DEGREES
EKG T AXIS: 53 DEGREES
EKG VENTRICULAR RATE: 71 BPM
EOSINOPHIL # BLD: 0.2 K/UL (ref 0–0.6)
EOSINOPHIL NFR BLD: 2.3 %
EPI CELLS #/AREA URNS HPF: ABNORMAL /HPF (ref 0–5)
GFR SERPLBLD CREATININE-BSD FMLA CKD-EPI: 89 ML/MIN/{1.73_M2}
GLUCOSE SERPL-MCNC: 96 MG/DL (ref 70–99)
GLUCOSE UR STRIP.AUTO-MCNC: NEGATIVE MG/DL
HCT VFR BLD AUTO: 43.9 % (ref 36–48)
HGB BLD-MCNC: 14.5 G/DL (ref 12–16)
HGB UR QL STRIP.AUTO: NEGATIVE
KETONES UR STRIP.AUTO-MCNC: NEGATIVE MG/DL
LEUKOCYTE ESTERASE UR QL STRIP.AUTO: ABNORMAL
LYMPHOCYTES # BLD: 1.4 K/UL (ref 1–5.1)
LYMPHOCYTES NFR BLD: 18 %
MCH RBC QN AUTO: 30.1 PG (ref 26–34)
MCHC RBC AUTO-ENTMCNC: 33.1 G/DL (ref 31–36)
MCV RBC AUTO: 90.8 FL (ref 80–100)
MONOCYTES # BLD: 0.8 K/UL (ref 0–1.3)
MONOCYTES NFR BLD: 10.2 %
NEUTROPHILS # BLD: 5.4 K/UL (ref 1.7–7.7)
NEUTROPHILS NFR BLD: 68.8 %
NITRITE UR QL STRIP.AUTO: NEGATIVE
PH UR STRIP.AUTO: 7 [PH] (ref 5–8)
PLATELET # BLD AUTO: 240 K/UL (ref 135–450)
PMV BLD AUTO: 8.1 FL (ref 5–10.5)
POTASSIUM SERPL-SCNC: 3.4 MMOL/L (ref 3.5–5.1)
PROT SERPL-MCNC: 7.3 G/DL (ref 6.4–8.2)
PROT UR STRIP.AUTO-MCNC: NEGATIVE MG/DL
RBC # BLD AUTO: 4.83 M/UL (ref 4–5.2)
RBC #/AREA URNS HPF: ABNORMAL /HPF (ref 0–4)
RENAL EPI CELLS #/AREA UR COMP ASSIST: ABNORMAL /HPF (ref 0–1)
SODIUM SERPL-SCNC: 139 MMOL/L (ref 136–145)
SP GR UR STRIP.AUTO: <=1.005 (ref 1–1.03)
TROPONIN, HIGH SENSITIVITY: 10 NG/L (ref 0–14)
UA DIPSTICK W REFLEX MICRO PNL UR: YES
URN SPEC COLLECT METH UR: ABNORMAL
UROBILINOGEN UR STRIP-ACNC: 0.2 E.U./DL
WBC # BLD AUTO: 7.9 K/UL (ref 4–11)
WBC #/AREA URNS HPF: ABNORMAL /HPF (ref 0–5)

## 2024-11-15 PROCEDURE — 93005 ELECTROCARDIOGRAM TRACING: CPT | Performed by: NURSE PRACTITIONER

## 2024-11-15 PROCEDURE — 1200000000 HC SEMI PRIVATE

## 2024-11-15 PROCEDURE — 6370000000 HC RX 637 (ALT 250 FOR IP): Performed by: INTERNAL MEDICINE

## 2024-11-15 PROCEDURE — 84484 ASSAY OF TROPONIN QUANT: CPT

## 2024-11-15 PROCEDURE — 80053 COMPREHEN METABOLIC PANEL: CPT

## 2024-11-15 PROCEDURE — 6360000002 HC RX W HCPCS: Performed by: INTERNAL MEDICINE

## 2024-11-15 PROCEDURE — 2580000003 HC RX 258: Performed by: INTERNAL MEDICINE

## 2024-11-15 PROCEDURE — 6370000000 HC RX 637 (ALT 250 FOR IP): Performed by: NURSE PRACTITIONER

## 2024-11-15 PROCEDURE — 71045 X-RAY EXAM CHEST 1 VIEW: CPT

## 2024-11-15 PROCEDURE — 81001 URINALYSIS AUTO W/SCOPE: CPT

## 2024-11-15 PROCEDURE — 85025 COMPLETE CBC W/AUTO DIFF WBC: CPT

## 2024-11-15 PROCEDURE — 99285 EMERGENCY DEPT VISIT HI MDM: CPT

## 2024-11-15 PROCEDURE — 93010 ELECTROCARDIOGRAM REPORT: CPT | Performed by: INTERNAL MEDICINE

## 2024-11-15 RX ORDER — DICYCLOMINE HYDROCHLORIDE 10 MG/1
10 CAPSULE ORAL
Status: DISCONTINUED | OUTPATIENT
Start: 2024-11-15 | End: 2024-11-20 | Stop reason: HOSPADM

## 2024-11-15 RX ORDER — PANTOPRAZOLE SODIUM 40 MG/1
40 TABLET, DELAYED RELEASE ORAL
Status: DISCONTINUED | OUTPATIENT
Start: 2024-11-16 | End: 2024-11-20 | Stop reason: HOSPADM

## 2024-11-15 RX ORDER — ONDANSETRON 4 MG/1
4 TABLET, ORALLY DISINTEGRATING ORAL EVERY 8 HOURS PRN
Status: DISCONTINUED | OUTPATIENT
Start: 2024-11-15 | End: 2024-11-20 | Stop reason: HOSPADM

## 2024-11-15 RX ORDER — ACETAMINOPHEN 325 MG/1
650 TABLET ORAL EVERY 6 HOURS PRN
Status: DISCONTINUED | OUTPATIENT
Start: 2024-11-15 | End: 2024-11-20 | Stop reason: HOSPADM

## 2024-11-15 RX ORDER — SODIUM CHLORIDE 0.9 % (FLUSH) 0.9 %
5-40 SYRINGE (ML) INJECTION PRN
Status: DISCONTINUED | OUTPATIENT
Start: 2024-11-15 | End: 2024-11-20 | Stop reason: HOSPADM

## 2024-11-15 RX ORDER — SODIUM CHLORIDE 9 MG/ML
INJECTION, SOLUTION INTRAVENOUS CONTINUOUS
Status: ACTIVE | OUTPATIENT
Start: 2024-11-15 | End: 2024-11-16

## 2024-11-15 RX ORDER — QUETIAPINE FUMARATE 25 MG/1
12.5 TABLET, FILM COATED ORAL NIGHTLY PRN
Status: ON HOLD | COMMUNITY
Start: 2024-09-23 | End: 2024-11-20 | Stop reason: HOSPADM

## 2024-11-15 RX ORDER — HYDROXYZINE PAMOATE 25 MG/1
50 CAPSULE ORAL NIGHTLY
Status: DISCONTINUED | OUTPATIENT
Start: 2024-11-15 | End: 2024-11-20 | Stop reason: HOSPADM

## 2024-11-15 RX ORDER — FLUTICASONE PROPIONATE 50 MCG
1 SPRAY, SUSPENSION (ML) NASAL DAILY PRN
Status: DISCONTINUED | OUTPATIENT
Start: 2024-11-15 | End: 2024-11-20 | Stop reason: HOSPADM

## 2024-11-15 RX ORDER — ACETAMINOPHEN 650 MG/1
650 SUPPOSITORY RECTAL EVERY 6 HOURS PRN
Status: DISCONTINUED | OUTPATIENT
Start: 2024-11-15 | End: 2024-11-20 | Stop reason: HOSPADM

## 2024-11-15 RX ORDER — MECLIZINE HCL 12.5 MG 12.5 MG/1
50 TABLET ORAL ONCE
Status: COMPLETED | OUTPATIENT
Start: 2024-11-15 | End: 2024-11-15

## 2024-11-15 RX ORDER — SODIUM CHLORIDE 0.9 % (FLUSH) 0.9 %
5-40 SYRINGE (ML) INJECTION EVERY 12 HOURS SCHEDULED
Status: DISCONTINUED | OUTPATIENT
Start: 2024-11-15 | End: 2024-11-20 | Stop reason: HOSPADM

## 2024-11-15 RX ORDER — GABAPENTIN 100 MG/1
100 CAPSULE ORAL 4 TIMES DAILY
COMMUNITY
Start: 2024-10-01

## 2024-11-15 RX ORDER — FLUTICASONE PROPIONATE 50 MCG
1 SPRAY, SUSPENSION (ML) NASAL DAILY
Status: DISCONTINUED | OUTPATIENT
Start: 2024-11-15 | End: 2024-11-15 | Stop reason: SDUPTHER

## 2024-11-15 RX ORDER — MECOBALAMIN 5000 MCG
10 TABLET,DISINTEGRATING ORAL NIGHTLY PRN
Status: DISCONTINUED | OUTPATIENT
Start: 2024-11-15 | End: 2024-11-20 | Stop reason: HOSPADM

## 2024-11-15 RX ORDER — TRAMADOL HYDROCHLORIDE 50 MG/1
50 TABLET ORAL EVERY 6 HOURS PRN
Status: DISCONTINUED | OUTPATIENT
Start: 2024-11-15 | End: 2024-11-16

## 2024-11-15 RX ORDER — TRAZODONE HYDROCHLORIDE 50 MG/1
50 TABLET, FILM COATED ORAL NIGHTLY
Status: DISCONTINUED | OUTPATIENT
Start: 2024-11-15 | End: 2024-11-20 | Stop reason: HOSPADM

## 2024-11-15 RX ORDER — M-VIT,TX,IRON,MINS/CALC/FOLIC 27MG-0.4MG
1 TABLET ORAL DAILY
Status: DISCONTINUED | OUTPATIENT
Start: 2024-11-16 | End: 2024-11-20 | Stop reason: HOSPADM

## 2024-11-15 RX ORDER — SODIUM CHLORIDE 9 MG/ML
INJECTION, SOLUTION INTRAVENOUS PRN
Status: DISCONTINUED | OUTPATIENT
Start: 2024-11-15 | End: 2024-11-20 | Stop reason: HOSPADM

## 2024-11-15 RX ORDER — QUETIAPINE FUMARATE 25 MG/1
12.5 TABLET, FILM COATED ORAL NIGHTLY PRN
Status: DISCONTINUED | OUTPATIENT
Start: 2024-11-15 | End: 2024-11-20 | Stop reason: HOSPADM

## 2024-11-15 RX ORDER — VENLAFAXINE HYDROCHLORIDE 37.5 MG/1
75 CAPSULE, EXTENDED RELEASE ORAL
Status: DISCONTINUED | OUTPATIENT
Start: 2024-11-16 | End: 2024-11-20 | Stop reason: HOSPADM

## 2024-11-15 RX ORDER — FLUTICASONE PROPIONATE 50 MCG
SPRAY, SUSPENSION (ML) NASAL
COMMUNITY
Start: 2024-02-29

## 2024-11-15 RX ORDER — GABAPENTIN 100 MG/1
200 CAPSULE ORAL NIGHTLY
Status: DISCONTINUED | OUTPATIENT
Start: 2024-11-15 | End: 2024-11-20 | Stop reason: HOSPADM

## 2024-11-15 RX ORDER — LORAZEPAM 1 MG/1
1 TABLET ORAL 2 TIMES DAILY PRN
Status: DISCONTINUED | OUTPATIENT
Start: 2024-11-15 | End: 2024-11-18

## 2024-11-15 RX ORDER — POLYETHYLENE GLYCOL 3350 17 G/17G
17 POWDER, FOR SOLUTION ORAL DAILY PRN
Status: DISCONTINUED | OUTPATIENT
Start: 2024-11-15 | End: 2024-11-20 | Stop reason: HOSPADM

## 2024-11-15 RX ORDER — ONDANSETRON 2 MG/ML
4 INJECTION INTRAMUSCULAR; INTRAVENOUS EVERY 6 HOURS PRN
Status: DISCONTINUED | OUTPATIENT
Start: 2024-11-15 | End: 2024-11-20 | Stop reason: HOSPADM

## 2024-11-15 RX ORDER — ENOXAPARIN SODIUM 100 MG/ML
40 INJECTION SUBCUTANEOUS DAILY
Status: DISCONTINUED | OUTPATIENT
Start: 2024-11-15 | End: 2024-11-20 | Stop reason: HOSPADM

## 2024-11-15 RX ORDER — LEVOTHYROXINE SODIUM 88 UG/1
88 TABLET ORAL DAILY
Status: DISCONTINUED | OUTPATIENT
Start: 2024-11-16 | End: 2024-11-20 | Stop reason: HOSPADM

## 2024-11-15 RX ORDER — BUDESONIDE 3 MG/1
3 CAPSULE, COATED PELLETS ORAL DAILY
Status: DISCONTINUED | OUTPATIENT
Start: 2024-11-16 | End: 2024-11-16

## 2024-11-15 RX ADMIN — ENOXAPARIN SODIUM 40 MG: 100 INJECTION SUBCUTANEOUS at 17:44

## 2024-11-15 RX ADMIN — TRAZODONE HYDROCHLORIDE 50 MG: 50 TABLET ORAL at 20:50

## 2024-11-15 RX ADMIN — GABAPENTIN 200 MG: 100 CAPSULE ORAL at 20:50

## 2024-11-15 RX ADMIN — MECLIZINE 50 MG: 12.5 TABLET ORAL at 13:51

## 2024-11-15 RX ADMIN — Medication 10 MG: at 22:33

## 2024-11-15 RX ADMIN — HYDROXYZINE PAMOATE 50 MG: 25 CAPSULE ORAL at 23:20

## 2024-11-15 RX ADMIN — SODIUM CHLORIDE: 9 INJECTION, SOLUTION INTRAVENOUS at 17:49

## 2024-11-15 RX ADMIN — ACETAMINOPHEN 650 MG: 325 TABLET ORAL at 16:24

## 2024-11-15 RX ADMIN — LORAZEPAM 1 MG: 1 TABLET ORAL at 20:50

## 2024-11-15 ASSESSMENT — PAIN DESCRIPTION - DESCRIPTORS: DESCRIPTORS: ACHING

## 2024-11-15 ASSESSMENT — LIFESTYLE VARIABLES
HOW MANY STANDARD DRINKS CONTAINING ALCOHOL DO YOU HAVE ON A TYPICAL DAY: PATIENT DOES NOT DRINK
HOW OFTEN DO YOU HAVE A DRINK CONTAINING ALCOHOL: NEVER

## 2024-11-15 ASSESSMENT — PAIN SCALES - GENERAL
PAINLEVEL_OUTOF10: 4
PAINLEVEL_OUTOF10: 0
PAINLEVEL_OUTOF10: 3

## 2024-11-15 ASSESSMENT — PAIN DESCRIPTION - PAIN TYPE: TYPE: CHRONIC PAIN

## 2024-11-15 ASSESSMENT — PAIN DESCRIPTION - LOCATION
LOCATION: ABDOMEN
LOCATION: HEAD

## 2024-11-15 ASSESSMENT — PAIN DESCRIPTION - ORIENTATION: ORIENTATION: MID

## 2024-11-15 ASSESSMENT — PAIN - FUNCTIONAL ASSESSMENT: PAIN_FUNCTIONAL_ASSESSMENT: 0-10

## 2024-11-15 NOTE — ED NOTES
Morenita Tovar is a 79 y.o. female admitted for  Principal Problem:    Vertigo  Resolved Problems:    * No resolved hospital problems. *  .   Patient Home via EMS transportation with   Chief Complaint   Patient presents with    Diarrhea     Patient to ED for concern for dehydration.  Patient reports diarrhea and dizziness \"for a long time\". Patient states that it got worse today and having room spinning dizziness.   Patient has hx of UC, sees Dr. Diamond.      .  Patient is alert and Person, Place, Time, and Situation  Patient's baseline mobility: Baseline Mobility: Independent   Code Status: Full Code   Cardiac Rhythm: Cardiac Rhythm: Sinus rhythm     Is patient on baseline Oxygen: no how many Liters:   Abnormal Assessment Findings: none    Isolation: None      NIH Score:    C-SSRS: Risk of Suicide: No Risk  Bedside swallow:        Active LDA's:   Peripheral IV 11/15/24 Left Antecubital (Active)   Site Assessment Clean, dry & intact 11/15/24 1132   Line Status Blood return noted;Specimen collected;Flushed 11/15/24 1132   Phlebitis Assessment No symptoms 11/15/24 1132   Infiltration Assessment 0 11/15/24 1132   Alcohol Cap Used No 11/15/24 1132   Dressing Status Intact w/seal 11/15/24 1132   Dressing Type Transparent 11/15/24 1132   Dressing Intervention New 11/15/24 1132     Patient admitted with a martinez:  If the martinez is chronic was it exchanged:  Reason for martinez:   Patient admitted with Central Line:  . PICC line placement confirmed:   Reason for Central line:   Was central line Inserted from an outside facility:        Family/Caregiver Present yes Any Concerns: no   Restraints no  Sitter no         Vitals: MEWS Score: 1    Vitals:    11/15/24 1129 11/15/24 1301 11/15/24 1432 11/15/24 1530   BP: (!) 158/93 (!) 149/74 (!) 163/88 (!) 168/94   Pulse: (!) 104 69 99 80   Resp: 14 10 20 18   Temp: 98 °F (36.7 °C)      TempSrc: Oral      SpO2: 97% 97% 96% 98%   Weight: 60.9 kg (134 lb 3.2 oz)      Height: 1.702 m (5' 7\")

## 2024-11-15 NOTE — CARE COORDINATION
Case Management Assessment  Initial Evaluation    Date/Time of Evaluation: 11/15/2024 2:16 PM  Assessment Completed by: HARINDER Singh    If patient is discharged prior to next notation, then this note serves as note for discharge by case management.    Patient Name: Morenita Tovar                   YOB: 1945  Diagnosis: No admission diagnoses are documented for this encounter.                   Date / Time: 11/15/2024 11:23 AM    Patient Admission Status: Emergency   Readmission Risk (Low < 19, Mod (19-27), High > 27): No data recorded  Current PCP: Stephanie Ramírez APRN - CNP  PCP verified by CM? (P) Yes    Chart Reviewed: Yes      History Provided by: (P) Patient, Child/Family  Patient Orientation: (P) Alert and Oriented    Patient Cognition: (P) Alert    Hospitalization in the last 30 days (Readmission):  No    If yes, Readmission Assessment in CM Navigator will be completed.    Advance Directives:      Code Status: Prior   Patient's Primary Decision Maker is: (P) Legal Next of Kin      Discharge Planning:    Patient lives with: (P) Alone Type of Home: (P) House  Primary Care Giver: (P) Self  Patient Support Systems include: (P) Children   Current Financial resources: (P) None  Current community resources: (P) ECF/Home Care  Current services prior to admission: (P) None            Current DME:              Type of Home Care services:  (P) None    ADLS  Prior functional level: (P) Assistance with the following:, Cooking, Housework, Shopping  Current functional level: (P) Assistance with the following:, Cooking, Housework, Shopping    PT AM-PAC:   /24  OT AM-PAC:   /24    Family can provide assistance at DC: (P) Yes  Would you like Case Management to discuss the discharge plan with any other family members/significant others, and if so, who? (P) Yes (daughters)  Plans to Return to Present Housing: (P) Unknown at present  Other Identified Issues/Barriers to RETURNING to current housing: weakness,

## 2024-11-15 NOTE — ED PROVIDER NOTES
Emergency Department Attending Provider Note  Location: [facility] Emergency Department  ED  11/15/2024     Patient Identification  Morenita Tovar is a 79 y.o. female evaluated in the Emergency Department for Diarrhea (Patient to ED for concern for dehydration./Patient reports diarrhea and dizziness \"for a long time\". Patient states that it got worse today and having room spinning dizziness. /Patient has hx of UC, sees Dr. Diamodn. /)      HPI: as noted above    Physical Exam:   Hypertensive, well-appearing, normal mental status, no nystagmus, cranial nerves II through XII intact, normal strength, normal sensation, clear breath sounds, soft nontender abdomen    NIHSS 0      EKG Interpretation  Rhythm: sinus  Rate: 71  Axis Normal  Conduction abnormalities:   No STEMI criteria      Patient seen and evaluated.  Relevant records reviewed.  Patient presents with symptoms noted above.     Patient presents with ongoing vertigo for the past several months.  Recent MRI shows chronic microvascular changes, small cerebellar stroke.  Also having chronic diarrhea  Labs are unrevealing.  Patient is having a hard time ambulating and taking care of herself at home, and does not feel safe returning home at this time.  She has a nonfocal neurologic exam.  At this point we will plan for inpatient evaluation of her dizziness/ discharge planning.    Although initial history and physical exam information was obtained by ASUNCION/NPP/MD/ (who also dictated a record of this visit), I personally saw the patient and made/approved the management plan and take responsibility for patient management. I, Dr. Fitzgerald, am the primary clinician of record.     This chart was generated in part by using Dragon Dictation system and may contain errors related to that system including errors in grammar, punctuation, and spelling, as well as words and phrases that may be inappropriate. If there are any questions or concerns please feel free to contact the dictating

## 2024-11-15 NOTE — CONSULTS
Consult Placed     Who: Dr. Todd Montgomery with Neurology via [facility]  Date: 11/15/24    Time: 1843     Electronically signed by Toyin Ragland RN on 11/15/2024 at 6:43 PM

## 2024-11-15 NOTE — ED PROVIDER NOTES
EMERGENCY DEPARTMENT ENCOUNTER        Pt Name: Morenita Tovar  MRN: [id number]  Birthdate 1945  Date of evaluation: 11/15/2024  Provider: JOSH Armijo CNP  PCP: Stephanie Ramírez APRN - CNP  Note Started: 7:34 PM EST 11/15/24       I have seen and evaluated this patient with my supervising physician Dr. Fitzgerald      CHIEF COMPLAINT       Chief Complaint   Patient presents with    Diarrhea     Patient to ED for concern for dehydration.  Patient reports diarrhea and dizziness \"for a long time\". Patient states that it got worse today and having room spinning dizziness.   Patient has hx of UC, sees Dr. Diamond.          HISTORY OF PRESENT ILLNESS: 1 or more Elements     History From: the patient  Limitations to history : None    Morenita Tovar is a 79 y.o. female who presents to the ER today with complaints of diarrhea, patient states that she has been having diarrhea, has a history of inflammatory bowel disease.  Patient also says she has been having dizziness that is been ongoing for quite a long time, patient states that at some point she had some testing that showed she may have had a remote stroke.  Denies any new symptoms today, she does live alone, concerned about her ability to care for herself at home.  Here for further evaluation.    Nursing Notes were all reviewed and agreed with or any disagreements were addressed in the HPI.    REVIEW OF SYSTEMS :      Review of Systems    Positives and Pertinent negatives as per HPI.     SURGICAL HISTORY     Past Surgical History:   Procedure Laterality Date    APPENDECTOMY      CATARACT REMOVAL Right 07/2016    CHOLECYSTECTOMY      PORT SURGERY N/A 1/28/2020    PORT REMOVAL        **LATEX SENSITIVE** performed by Alvetro Holt MD at [facility] OR    SPINAL FUSION      TONSILLECTOMY      UPPER GASTROINTESTINAL ENDOSCOPY  04/11/2018    UPPER EUS       CURRENTMEDICATIONS       Current Discharge Medication List        CONTINUE these medications which have

## 2024-11-15 NOTE — H&P
Hospital Medicine History & Physical      Date of Admission: 11/15/2024    Date of Service:  Pt seen/examined on 11/15/2024     [x]Admitted to Inpatient with expected LOS greater than two midnights due to medical therapy.  []Placed in Observation status.    Chief Admission Complaint: Weakness, lightheadedness vertigo and diarrhea.     Presenting Admission History:      79 y.o. female with PMH significant for ulcerative colitis she follows with [facility], depression and anxiety, hypothyroidism.  Also gives a history of lymphoma in 2018, she was treated with chemotherapy she informs me this has been in remission and follows yearly with [facility]/ Dr Lugo.    She presented to [facility] with c/o dizziness, lightheadedness and weakness.  She also states been having diarrhea and with an ulcerative colitis flare up.    She states lightheadedness dizziness been on for a few months presenting much worse over the past few days.  She feels the room spinning around her and has unsteady gait.  It is felt she is unsteady on her feet and she is at risk of falling and unsafe to go home .  Therefore she is admitted for further evaluation.    She denies any chest pain, shortness of breath.  No nausea vomiting.    Assessment/Plan:      Current Principal Problem:  Vertigo    Vertigo, lightheadedness : She reports this is been ongoing for the past few months but getting worse over the past week.  Very unsteady on her feet and felt unsafe for her to go home as she is a very high fall risk.  Check a CT of the head, will follow orthostatic blood pressures.  Will IV fluid hydration.  Will get PT/OT to evaluate  Will also have neurology consultation for any further input  Will also order meclizine to see if this helps.    Ulcerative colitis : She reports had a recent flare up.  She does follow with [facility]/Dr Hutchins.  She did have evaluated with a colonoscopy last week.  She was prescribed mesalamine and

## 2024-11-16 ENCOUNTER — APPOINTMENT (OUTPATIENT)
Dept: CT IMAGING | Age: 79
DRG: 103 | End: 2024-11-16
Payer: MEDICARE

## 2024-11-16 LAB
ALBUMIN SERPL-MCNC: 3.4 G/DL (ref 3.4–5)
ALBUMIN/GLOB SERPL: 1.4 {RATIO} (ref 1.1–2.2)
ALP SERPL-CCNC: 72 U/L (ref 40–129)
ALT SERPL-CCNC: 15 U/L (ref 10–40)
ANION GAP SERPL CALCULATED.3IONS-SCNC: 12 MMOL/L (ref 3–16)
AST SERPL-CCNC: 14 U/L (ref 15–37)
BASOPHILS # BLD: 0 K/UL (ref 0–0.2)
BASOPHILS NFR BLD: 0.6 %
BILIRUB SERPL-MCNC: 0.3 MG/DL (ref 0–1)
BUN SERPL-MCNC: 14 MG/DL (ref 7–20)
CALCIUM SERPL-MCNC: 8.6 MG/DL (ref 8.3–10.6)
CHLORIDE SERPL-SCNC: 101 MMOL/L (ref 99–110)
CO2 SERPL-SCNC: 27 MMOL/L (ref 21–32)
CREAT SERPL-MCNC: 0.7 MG/DL (ref 0.6–1.2)
DEPRECATED RDW RBC AUTO: 13.1 % (ref 12.4–15.4)
EOSINOPHIL # BLD: 0.1 K/UL (ref 0–0.6)
EOSINOPHIL NFR BLD: 1.2 %
GFR SERPLBLD CREATININE-BSD FMLA CKD-EPI: 89 ML/MIN/{1.73_M2}
GLUCOSE SERPL-MCNC: 111 MG/DL (ref 70–99)
HCT VFR BLD AUTO: 39.2 % (ref 36–48)
HGB BLD-MCNC: 13 G/DL (ref 12–16)
LYMPHOCYTES # BLD: 1.4 K/UL (ref 1–5.1)
LYMPHOCYTES NFR BLD: 18.2 %
MAGNESIUM SERPL-MCNC: 1.99 MG/DL (ref 1.8–2.4)
MCH RBC QN AUTO: 29.8 PG (ref 26–34)
MCHC RBC AUTO-ENTMCNC: 33 G/DL (ref 31–36)
MCV RBC AUTO: 90.1 FL (ref 80–100)
MONOCYTES # BLD: 0.6 K/UL (ref 0–1.3)
MONOCYTES NFR BLD: 7.7 %
NEUTROPHILS # BLD: 5.7 K/UL (ref 1.7–7.7)
NEUTROPHILS NFR BLD: 72.3 %
PLATELET # BLD AUTO: 224 K/UL (ref 135–450)
PMV BLD AUTO: 7.9 FL (ref 5–10.5)
POTASSIUM SERPL-SCNC: 3.3 MMOL/L (ref 3.5–5.1)
PROT SERPL-MCNC: 5.9 G/DL (ref 6.4–8.2)
RBC # BLD AUTO: 4.35 M/UL (ref 4–5.2)
SODIUM SERPL-SCNC: 140 MMOL/L (ref 136–145)
WBC # BLD AUTO: 7.9 K/UL (ref 4–11)

## 2024-11-16 PROCEDURE — 70450 CT HEAD/BRAIN W/O DYE: CPT

## 2024-11-16 PROCEDURE — 36415 COLL VENOUS BLD VENIPUNCTURE: CPT

## 2024-11-16 PROCEDURE — 97116 GAIT TRAINING THERAPY: CPT

## 2024-11-16 PROCEDURE — 97530 THERAPEUTIC ACTIVITIES: CPT

## 2024-11-16 PROCEDURE — 6370000000 HC RX 637 (ALT 250 FOR IP): Performed by: INTERNAL MEDICINE

## 2024-11-16 PROCEDURE — 80053 COMPREHEN METABOLIC PANEL: CPT

## 2024-11-16 PROCEDURE — 99223 1ST HOSP IP/OBS HIGH 75: CPT | Performed by: STUDENT IN AN ORGANIZED HEALTH CARE EDUCATION/TRAINING PROGRAM

## 2024-11-16 PROCEDURE — 85025 COMPLETE CBC W/AUTO DIFF WBC: CPT

## 2024-11-16 PROCEDURE — 97166 OT EVAL MOD COMPLEX 45 MIN: CPT

## 2024-11-16 PROCEDURE — 83735 ASSAY OF MAGNESIUM: CPT

## 2024-11-16 PROCEDURE — 2580000003 HC RX 258: Performed by: INTERNAL MEDICINE

## 2024-11-16 PROCEDURE — 97162 PT EVAL MOD COMPLEX 30 MIN: CPT

## 2024-11-16 PROCEDURE — 6360000002 HC RX W HCPCS: Performed by: INTERNAL MEDICINE

## 2024-11-16 PROCEDURE — 1200000000 HC SEMI PRIVATE

## 2024-11-16 PROCEDURE — 97110 THERAPEUTIC EXERCISES: CPT

## 2024-11-16 RX ORDER — POTASSIUM CHLORIDE 1500 MG/1
20 TABLET, EXTENDED RELEASE ORAL ONCE
Status: COMPLETED | OUTPATIENT
Start: 2024-11-16 | End: 2024-11-16

## 2024-11-16 RX ORDER — BUDESONIDE 3 MG/1
3 CAPSULE, COATED PELLETS ORAL DAILY
Status: DISCONTINUED | OUTPATIENT
Start: 2024-11-16 | End: 2024-11-16

## 2024-11-16 RX ORDER — BUDESONIDE 3 MG/1
9 CAPSULE, COATED PELLETS ORAL DAILY
Status: DISCONTINUED | OUTPATIENT
Start: 2024-11-17 | End: 2024-11-20 | Stop reason: HOSPADM

## 2024-11-16 RX ORDER — MECLIZINE HCL 12.5 MG 12.5 MG/1
25 TABLET ORAL 3 TIMES DAILY PRN
Status: DISCONTINUED | OUTPATIENT
Start: 2024-11-16 | End: 2024-11-20 | Stop reason: HOSPADM

## 2024-11-16 RX ADMIN — MESALAMINE 500 MG: 250 CAPSULE ORAL at 08:47

## 2024-11-16 RX ADMIN — BUDESONIDE 3 MG: 3 CAPSULE, GELATIN COATED ORAL at 20:56

## 2024-11-16 RX ADMIN — MESALAMINE 500 MG: 250 CAPSULE ORAL at 20:56

## 2024-11-16 RX ADMIN — POTASSIUM CHLORIDE 20 MEQ: 1500 TABLET, EXTENDED RELEASE ORAL at 08:47

## 2024-11-16 RX ADMIN — Medication 10 ML: at 20:59

## 2024-11-16 RX ADMIN — DICYCLOMINE HYDROCHLORIDE 10 MG: 10 CAPSULE ORAL at 08:47

## 2024-11-16 RX ADMIN — ACETAMINOPHEN 650 MG: 325 TABLET ORAL at 13:57

## 2024-11-16 RX ADMIN — TRAZODONE HYDROCHLORIDE 50 MG: 50 TABLET ORAL at 20:56

## 2024-11-16 RX ADMIN — DICYCLOMINE HYDROCHLORIDE 10 MG: 10 CAPSULE ORAL at 20:56

## 2024-11-16 RX ADMIN — LORAZEPAM 1 MG: 1 TABLET ORAL at 13:57

## 2024-11-16 RX ADMIN — POTASSIUM CHLORIDE 20 MEQ: 1500 TABLET, EXTENDED RELEASE ORAL at 20:57

## 2024-11-16 RX ADMIN — ENOXAPARIN SODIUM 40 MG: 100 INJECTION SUBCUTANEOUS at 08:48

## 2024-11-16 RX ADMIN — DICYCLOMINE HYDROCHLORIDE 10 MG: 10 CAPSULE ORAL at 16:36

## 2024-11-16 RX ADMIN — HYDROXYZINE PAMOATE 50 MG: 25 CAPSULE ORAL at 20:56

## 2024-11-16 RX ADMIN — LEVOTHYROXINE SODIUM 88 MCG: 88 TABLET ORAL at 08:47

## 2024-11-16 RX ADMIN — GABAPENTIN 200 MG: 100 CAPSULE ORAL at 20:56

## 2024-11-16 RX ADMIN — VENLAFAXINE HYDROCHLORIDE 75 MG: 37.5 CAPSULE, EXTENDED RELEASE ORAL at 08:47

## 2024-11-16 RX ADMIN — Medication 1 TABLET: at 08:47

## 2024-11-16 ASSESSMENT — PAIN SCALES - GENERAL
PAINLEVEL_OUTOF10: 3
PAINLEVEL_OUTOF10: 2
PAINLEVEL_OUTOF10: 0

## 2024-11-16 ASSESSMENT — PAIN DESCRIPTION - DESCRIPTORS
DESCRIPTORS: ACHING
DESCRIPTORS: ACHING

## 2024-11-16 ASSESSMENT — PAIN DESCRIPTION - LOCATION
LOCATION: HEAD
LOCATION: HEAD

## 2024-11-16 ASSESSMENT — PAIN DESCRIPTION - ORIENTATION
ORIENTATION: LEFT
ORIENTATION: LEFT

## 2024-11-16 ASSESSMENT — PAIN SCALES - WONG BAKER: WONGBAKER_NUMERICALRESPONSE: NO HURT

## 2024-11-16 ASSESSMENT — PAIN DESCRIPTION - PAIN TYPE: TYPE: CHRONIC PAIN

## 2024-11-16 NOTE — CONSULTS
Inpatient Neurology Consult  [facility]  Juancho Montgomery MD    Morenita Tovar  1945    Date of Service: 11/16/2024    Referring Physician: Elvia So MD    Reason for the consult and CC: dizziness    HPI:   Morenita Tovar is a 79 y.o. female who  has a past medical history of Anxiety, Female bladder prolapse, Heart murmur, Hypothyroid, Interstitial cystitis, Osteopenia, Panic anxiety syndrome, Scoliosis, Small cell B-cell lymphoma (HCC), and Ulcerative colitis (HCC). Admitted for dizziness. Afebrile. SBP 130s-160s during admission.     She has dizziness and vertigo (two  experiences per her) for many months. Poor historian. Unclear onset so most likely gradual progressive. Associated with multiple medication changes but not sure which ones. The vertigo is episodic lasting >1 hour <24 hours non positional and spontaneous. Seems to be worse with visual stimuli. Chronic nausea not worse with vertigo spells. The dizziness is persistent worse when standing or sitting upright but also present when laying down. Also worse with visual stimuli. Tends to prefer dark rooms. She also has a daily persistent headache present for many years over left frontal region that radiates down her left arm to her hand associated with neck stiffness and pain.     I personally reviewed and updated social history, past medical history, medications, allergy, surgical history, and family history as documented in the patient's electronic health records.     ROS: 10-14 ROS reviewed with the patient/nurse/family which were unremarkable except mentioned in H&P.    Physical Examination  Vitals:    11/15/24 2050 11/16/24 0303 11/16/24 0845 11/16/24 1056   BP: (!) 158/90  (!) 149/79 131/74   Pulse: 72  68 65   Resp: 18  17 16   Temp: 98.4 °F (36.9 °C)  98 °F (36.7 °C) 97.5 °F (36.4 °C)   TempSrc: Oral  Oral Oral   SpO2: 97%  96% 96%   Weight:  59.7 kg (131 lb 9.6 oz)     Height:            Mental Status:   Alert. Oriented to

## 2024-11-17 LAB
ANION GAP SERPL CALCULATED.3IONS-SCNC: 11 MMOL/L (ref 3–16)
BUN SERPL-MCNC: 15 MG/DL (ref 7–20)
CALCIUM SERPL-MCNC: 8.4 MG/DL (ref 8.3–10.6)
CHLORIDE SERPL-SCNC: 102 MMOL/L (ref 99–110)
CO2 SERPL-SCNC: 26 MMOL/L (ref 21–32)
CREAT SERPL-MCNC: 0.6 MG/DL (ref 0.6–1.2)
DEPRECATED RDW RBC AUTO: 13 % (ref 12.4–15.4)
GFR SERPLBLD CREATININE-BSD FMLA CKD-EPI: >90 ML/MIN/{1.73_M2}
GLUCOSE SERPL-MCNC: 92 MG/DL (ref 70–99)
HCT VFR BLD AUTO: 37.5 % (ref 36–48)
HGB BLD-MCNC: 12.3 G/DL (ref 12–16)
MAGNESIUM SERPL-MCNC: 2 MG/DL (ref 1.8–2.4)
MCH RBC QN AUTO: 29.8 PG (ref 26–34)
MCHC RBC AUTO-ENTMCNC: 32.9 G/DL (ref 31–36)
MCV RBC AUTO: 90.7 FL (ref 80–100)
PLATELET # BLD AUTO: 216 K/UL (ref 135–450)
PMV BLD AUTO: 8.2 FL (ref 5–10.5)
POTASSIUM SERPL-SCNC: 3.8 MMOL/L (ref 3.5–5.1)
RBC # BLD AUTO: 4.13 M/UL (ref 4–5.2)
SODIUM SERPL-SCNC: 139 MMOL/L (ref 136–145)
WBC # BLD AUTO: 6.2 K/UL (ref 4–11)

## 2024-11-17 PROCEDURE — 2580000003 HC RX 258: Performed by: INTERNAL MEDICINE

## 2024-11-17 PROCEDURE — 1200000000 HC SEMI PRIVATE

## 2024-11-17 PROCEDURE — 6370000000 HC RX 637 (ALT 250 FOR IP): Performed by: INTERNAL MEDICINE

## 2024-11-17 PROCEDURE — 6370000000 HC RX 637 (ALT 250 FOR IP): Performed by: NURSE PRACTITIONER

## 2024-11-17 PROCEDURE — 85027 COMPLETE CBC AUTOMATED: CPT

## 2024-11-17 PROCEDURE — 6360000002 HC RX W HCPCS: Performed by: INTERNAL MEDICINE

## 2024-11-17 PROCEDURE — 80048 BASIC METABOLIC PNL TOTAL CA: CPT

## 2024-11-17 PROCEDURE — 83735 ASSAY OF MAGNESIUM: CPT

## 2024-11-17 PROCEDURE — 36415 COLL VENOUS BLD VENIPUNCTURE: CPT

## 2024-11-17 RX ADMIN — LEVOTHYROXINE SODIUM 88 MCG: 88 TABLET ORAL at 05:38

## 2024-11-17 RX ADMIN — Medication 10 ML: at 19:49

## 2024-11-17 RX ADMIN — DICYCLOMINE HYDROCHLORIDE 10 MG: 10 CAPSULE ORAL at 05:38

## 2024-11-17 RX ADMIN — HYDROXYZINE PAMOATE 50 MG: 25 CAPSULE ORAL at 19:29

## 2024-11-17 RX ADMIN — Medication 1 TABLET: at 09:33

## 2024-11-17 RX ADMIN — TRAZODONE HYDROCHLORIDE 50 MG: 50 TABLET ORAL at 19:28

## 2024-11-17 RX ADMIN — Medication 10 ML: at 09:34

## 2024-11-17 RX ADMIN — DICYCLOMINE HYDROCHLORIDE 10 MG: 10 CAPSULE ORAL at 15:24

## 2024-11-17 RX ADMIN — GABAPENTIN 200 MG: 100 CAPSULE ORAL at 19:29

## 2024-11-17 RX ADMIN — LORAZEPAM 1 MG: 1 TABLET ORAL at 09:42

## 2024-11-17 RX ADMIN — DICYCLOMINE HYDROCHLORIDE 10 MG: 10 CAPSULE ORAL at 19:29

## 2024-11-17 RX ADMIN — PANTOPRAZOLE SODIUM 40 MG: 40 TABLET, DELAYED RELEASE ORAL at 05:38

## 2024-11-17 RX ADMIN — VENLAFAXINE HYDROCHLORIDE 75 MG: 37.5 CAPSULE, EXTENDED RELEASE ORAL at 09:33

## 2024-11-17 RX ADMIN — MESALAMINE 500 MG: 250 CAPSULE ORAL at 19:29

## 2024-11-17 RX ADMIN — ACETAMINOPHEN 650 MG: 325 TABLET ORAL at 15:24

## 2024-11-17 RX ADMIN — ENOXAPARIN SODIUM 40 MG: 100 INJECTION SUBCUTANEOUS at 09:33

## 2024-11-17 RX ADMIN — MESALAMINE 500 MG: 250 CAPSULE ORAL at 09:33

## 2024-11-17 RX ADMIN — BUDESONIDE 9 MG: 3 CAPSULE, GELATIN COATED ORAL at 09:33

## 2024-11-17 ASSESSMENT — PAIN SCALES - GENERAL: PAINLEVEL_OUTOF10: 2

## 2024-11-17 ASSESSMENT — PAIN SCALES - WONG BAKER
WONGBAKER_NUMERICALRESPONSE: NO HURT

## 2024-11-17 ASSESSMENT — PAIN DESCRIPTION - DESCRIPTORS: DESCRIPTORS: DISCOMFORT

## 2024-11-17 ASSESSMENT — PAIN DESCRIPTION - PAIN TYPE: TYPE: CHRONIC PAIN

## 2024-11-17 ASSESSMENT — PAIN DESCRIPTION - LOCATION: LOCATION: ABDOMEN

## 2024-11-17 ASSESSMENT — PAIN DESCRIPTION - ORIENTATION: ORIENTATION: LOWER;MID

## 2024-11-17 NOTE — CARE COORDINATION
Pt very [place], stopped by room to discuss SNF. Pt bathing. Is agreeable to SNF, left SNF list in room for her referral choices.

## 2024-11-17 NOTE — CARE COORDINATION
Attempt to call pt room/cell to discuss SNF, unable to reach at this time. Will continue to attempt today.

## 2024-11-18 PROCEDURE — 97110 THERAPEUTIC EXERCISES: CPT

## 2024-11-18 PROCEDURE — 6360000002 HC RX W HCPCS: Performed by: INTERNAL MEDICINE

## 2024-11-18 PROCEDURE — 97530 THERAPEUTIC ACTIVITIES: CPT

## 2024-11-18 PROCEDURE — 1200000000 HC SEMI PRIVATE

## 2024-11-18 PROCEDURE — 97116 GAIT TRAINING THERAPY: CPT

## 2024-11-18 PROCEDURE — 97535 SELF CARE MNGMENT TRAINING: CPT

## 2024-11-18 PROCEDURE — 6370000000 HC RX 637 (ALT 250 FOR IP): Performed by: NURSE PRACTITIONER

## 2024-11-18 PROCEDURE — 6370000000 HC RX 637 (ALT 250 FOR IP): Performed by: INTERNAL MEDICINE

## 2024-11-18 RX ORDER — MESALAMINE 4 G/60ML
4 SUSPENSION RECTAL NIGHTLY
Status: DISCONTINUED | OUTPATIENT
Start: 2024-11-18 | End: 2024-11-20 | Stop reason: HOSPADM

## 2024-11-18 RX ORDER — LORAZEPAM 0.5 MG/1
0.5 TABLET ORAL DAILY
Status: COMPLETED | OUTPATIENT
Start: 2024-11-18 | End: 2024-11-20

## 2024-11-18 RX ADMIN — DICYCLOMINE HYDROCHLORIDE 10 MG: 10 CAPSULE ORAL at 08:50

## 2024-11-18 RX ADMIN — Medication 1 TABLET: at 08:51

## 2024-11-18 RX ADMIN — MESALAMINE 500 MG: 250 CAPSULE ORAL at 20:33

## 2024-11-18 RX ADMIN — DICYCLOMINE HYDROCHLORIDE 10 MG: 10 CAPSULE ORAL at 16:50

## 2024-11-18 RX ADMIN — HYDROXYZINE PAMOATE 50 MG: 25 CAPSULE ORAL at 20:26

## 2024-11-18 RX ADMIN — VENLAFAXINE HYDROCHLORIDE 75 MG: 37.5 CAPSULE, EXTENDED RELEASE ORAL at 08:51

## 2024-11-18 RX ADMIN — ACETAMINOPHEN 650 MG: 325 TABLET ORAL at 13:29

## 2024-11-18 RX ADMIN — GABAPENTIN 200 MG: 100 CAPSULE ORAL at 20:27

## 2024-11-18 RX ADMIN — LORAZEPAM 0.5 MG: 0.5 TABLET ORAL at 13:27

## 2024-11-18 RX ADMIN — LEVOTHYROXINE SODIUM 88 MCG: 88 TABLET ORAL at 08:50

## 2024-11-18 RX ADMIN — TRAZODONE HYDROCHLORIDE 50 MG: 50 TABLET ORAL at 20:26

## 2024-11-18 RX ADMIN — MESALAMINE 4 G: 4 ENEMA RECTAL at 20:40

## 2024-11-18 RX ADMIN — MESALAMINE 500 MG: 250 CAPSULE ORAL at 08:52

## 2024-11-18 RX ADMIN — BUDESONIDE 9 MG: 3 CAPSULE, GELATIN COATED ORAL at 08:50

## 2024-11-18 ASSESSMENT — PAIN SCALES - GENERAL
PAINLEVEL_OUTOF10: 3
PAINLEVEL_OUTOF10: 0

## 2024-11-18 NOTE — FLOWSHEET NOTE
Orthostatic Vitals:      11/18/2024     1:15 PM   Orthostatic Vitals   Orthostatic B/P and Pulse? Yes   Blood Pressure Lying 134/71   Pulse Lying 74 PER MINUTE   Blood Pressure Sitting 166/84   Pulse Sitting 85 PER MINUTE   Blood Pressure Standing 160/76   Pulse Standing 82 PER MINUTE        Normal vision: sees adequately in most situations; can see medication labels, newsprint

## 2024-11-18 NOTE — CARE COORDINATION
[facility] - Acute Rehab Unit   After review, this patient is felt to be:       []  Appropriate for Acute Inpatient Rehab    []  Appropriate for Acute Inpatient Rehab Pending Insurance Authorization    []  Not appropriate for Acute Inpatient Rehab    [x]  Referral received and ARU reviewing patient; Evaluation ongoing.      Await updated therapy notes as her previous OT notes maybe too functional to allow an ARU admission.  Also, requested SLP assessment since there are notes indicating some possible confusion which could help qualify her for an ARU stay.   Will notify DCP with further updates. Thank you for the referral.      Uday Car MPH, RRT  ARU Admissions Supervisor-[facility]  (C)[phone]  (F)[phone]   Electronically signed by Uday Car on 11/18/2024 at 2:14 PM

## 2024-11-18 NOTE — CARE COORDINATION
Chart reviewed day 3. Pt is followed by IM, Neuro and Gi. Pending Gi consult. Med changes per IM. Referral pending to the ARU per family preference. Pt w/some confusion. Will follow or needs as they arise. Electronically signed by DANIELA GUAJARDO RN on 11/18/2024 at 1:30 PM

## 2024-11-18 NOTE — DISCHARGE INSTRUCTIONS
Continue to monitor for improved dizziness for 2 weeks after stopping Seroquel.   If dizziness does not improve, then stop taking gabapentin and monitor for improvement in dizziness for the following 2 weeks.   If dizziness does not improve, then talk to the prescribing doctor about reducing dose or stopping lorazepam (Ativan).

## 2024-11-18 NOTE — CONSULTS
GI Consult Note      Reason for Consult:  patient known to Dr Hutchins, with Ulcerative colitis, had recent colonscopy   Requesting Physician:  Judah    CHIEF COMPLAINT:  weakness    History Obtained From:  patient, electronic medical record    HISTORY OF PRESENT ILLNESS:                The patient is a 79 y.o. female with significant past medical history of lymphoma, hypothyroidism and UC-PS followed by Dr Hutchins managed with mesalamine and budesonide who presents with weakness and dizziness.   Head CT w/o acute abnormality.  Neurology involved. CLS 2 weeks demonstrated signs of active disease.  Budesonide was added to mesalamine as she is intolerant of prednisone.  UC related sxs have been present for a month.  She notes postprandial loose bloody stools associated with cramping and bloating. Urgency is an issue.  No change since starting the budesonide 2 weeks ago.      Past Medical History:        Diagnosis Date    Anxiety     Female bladder prolapse     Heart murmur     no cardio and no treatment    Hypothyroid     Insomnia     Interstitial cystitis     Osteopenia     Panic anxiety syndrome     anxiety/depression    Scoliosis     Small cell B-cell lymphoma (HCC)     Ulcerative colitis (HCC)      Past Surgical History:        Procedure Laterality Date    APPENDECTOMY      CATARACT REMOVAL Right 07/2016    CHOLECYSTECTOMY      PORT SURGERY N/A 1/28/2020    PORT REMOVAL        **LATEX SENSITIVE** performed by Alverto Holt MD at [facility] OR    SPINAL FUSION      TONSILLECTOMY      UPPER GASTROINTESTINAL ENDOSCOPY  04/11/2018    UPPER EUS     Current Medications:    Current Facility-Administered Medications: LORazepam (ATIVAN) tablet 0.5 mg, 0.5 mg, Oral, Daily  meclizine (ANTIVERT) tablet 25 mg, 25 mg, Oral, TID PRN  budesonide (ENTOCORT EC) delayed release capsule 9 mg, 9 mg, Oral, Daily  venlafaxine (EFFEXOR XR) extended release capsule 75 mg, 75 mg, Oral, Daily with breakfast  gabapentin (NEURONTIN)

## 2024-11-19 PROCEDURE — 97110 THERAPEUTIC EXERCISES: CPT

## 2024-11-19 PROCEDURE — 97116 GAIT TRAINING THERAPY: CPT

## 2024-11-19 PROCEDURE — 1200000000 HC SEMI PRIVATE

## 2024-11-19 PROCEDURE — 6360000002 HC RX W HCPCS: Performed by: INTERNAL MEDICINE

## 2024-11-19 PROCEDURE — 6370000000 HC RX 637 (ALT 250 FOR IP): Performed by: NURSE PRACTITIONER

## 2024-11-19 PROCEDURE — 6370000000 HC RX 637 (ALT 250 FOR IP): Performed by: INTERNAL MEDICINE

## 2024-11-19 PROCEDURE — 97530 THERAPEUTIC ACTIVITIES: CPT

## 2024-11-19 PROCEDURE — 97535 SELF CARE MNGMENT TRAINING: CPT

## 2024-11-19 RX ADMIN — MESALAMINE 4 G: 4 ENEMA RECTAL at 21:42

## 2024-11-19 RX ADMIN — VENLAFAXINE HYDROCHLORIDE 75 MG: 37.5 CAPSULE, EXTENDED RELEASE ORAL at 09:16

## 2024-11-19 RX ADMIN — ENOXAPARIN SODIUM 40 MG: 100 INJECTION SUBCUTANEOUS at 09:18

## 2024-11-19 RX ADMIN — DICYCLOMINE HYDROCHLORIDE 10 MG: 10 CAPSULE ORAL at 11:18

## 2024-11-19 RX ADMIN — LEVOTHYROXINE SODIUM 88 MCG: 88 TABLET ORAL at 05:15

## 2024-11-19 RX ADMIN — DICYCLOMINE HYDROCHLORIDE 10 MG: 10 CAPSULE ORAL at 21:36

## 2024-11-19 RX ADMIN — GABAPENTIN 200 MG: 100 CAPSULE ORAL at 21:36

## 2024-11-19 RX ADMIN — DICYCLOMINE HYDROCHLORIDE 10 MG: 10 CAPSULE ORAL at 05:14

## 2024-11-19 RX ADMIN — HYDROXYZINE PAMOATE 50 MG: 25 CAPSULE ORAL at 21:36

## 2024-11-19 RX ADMIN — ACETAMINOPHEN 650 MG: 325 TABLET ORAL at 09:16

## 2024-11-19 RX ADMIN — MESALAMINE 500 MG: 250 CAPSULE ORAL at 09:22

## 2024-11-19 RX ADMIN — ACETAMINOPHEN 650 MG: 325 TABLET ORAL at 17:10

## 2024-11-19 RX ADMIN — LORAZEPAM 0.5 MG: 0.5 TABLET ORAL at 09:16

## 2024-11-19 RX ADMIN — MESALAMINE 500 MG: 250 CAPSULE ORAL at 21:45

## 2024-11-19 RX ADMIN — TRAZODONE HYDROCHLORIDE 50 MG: 50 TABLET ORAL at 21:36

## 2024-11-19 RX ADMIN — BUDESONIDE 9 MG: 3 CAPSULE, GELATIN COATED ORAL at 09:23

## 2024-11-19 RX ADMIN — Medication 1 TABLET: at 09:16

## 2024-11-19 ASSESSMENT — PAIN SCALES - GENERAL
PAINLEVEL_OUTOF10: 0
PAINLEVEL_OUTOF10: 3
PAINLEVEL_OUTOF10: 0
PAINLEVEL_OUTOF10: 3

## 2024-11-19 ASSESSMENT — PAIN DESCRIPTION - ORIENTATION
ORIENTATION: MID
ORIENTATION: MID

## 2024-11-19 ASSESSMENT — PAIN DESCRIPTION - LOCATION
LOCATION: HEAD
LOCATION: HEAD

## 2024-11-19 ASSESSMENT — PAIN DESCRIPTION - DESCRIPTORS
DESCRIPTORS: ACHING
DESCRIPTORS: ACHING

## 2024-11-19 NOTE — CARE COORDINATION
Anticipate d/c per IM. ARU denied. Referrals placed to [facility]; no cert needed. Electronically signed by DANIELA GUAJARDO RN on 11/19/2024 at 2:06 PM

## 2024-11-19 NOTE — CONSULTS
Patient: Morenita Tovar  [id number]  Date: 11/19/2024      Chief Complaint: dizziness    History of Present Illness/Hospital Course:  Morenita Tovar is a 79 year old female with a past medical history significant for anxiety, hypothyroidism, and ulcerative colitis who presented to [facility] on 11/15/24 with dizziness, light headedness, and weakness. CT head was negative for acute process. Neurology was consulted. Per report, patient has had chronic episodic vertigo. There was concern for medication contributing to symptoms. Hospital stay was otherwise uncomplicated. She continues to have functional deficits below her baseline. Today Karma is seen with therapy. She reports some continued dizziness and feeling off balance. She lives alone in a one level condo with 2 RADHIKA.      has a past medical history of Anxiety, Female bladder prolapse, Heart murmur, Hypothyroid, Insomnia, Interstitial cystitis, Osteopenia, Panic anxiety syndrome, Scoliosis, Small cell B-cell lymphoma (HCC), and Ulcerative colitis (HCC).     has a past surgical history that includes Cholecystectomy; Appendectomy; Tonsillectomy; Cataract removal (Right, 07/2016); Spinal fusion; Upper gastrointestinal endoscopy (04/11/2018); and Port Surgery (N/A, 1/28/2020).     reports that she quit smoking about 26 years ago. Her smoking use included cigarettes. She started smoking about 56 years ago. She has a 30 pack-year smoking history. She has never used smokeless tobacco. She reports that she does not drink alcohol and does not use drugs.    family history includes Anxiety Disorder in her sister; Breast Cancer in her sister; Cancer in her brother, brother, and sister; Diabetes in her brother; Heart Disease in her sister; High Blood Pressure in her brother and brother; Osteoporosis in her mother; Other in her father; Stroke in her father and mother.      REVIEW OF SYSTEMS:   CONSTITUTIONAL: negative for fevers, chills, diaphoresis, activity change, appetite

## 2024-11-20 VITALS
HEART RATE: 84 BPM | HEIGHT: 67 IN | SYSTOLIC BLOOD PRESSURE: 132 MMHG | RESPIRATION RATE: 16 BRPM | WEIGHT: 133.8 LBS | OXYGEN SATURATION: 96 % | BODY MASS INDEX: 21 KG/M2 | TEMPERATURE: 97.9 F | DIASTOLIC BLOOD PRESSURE: 70 MMHG

## 2024-11-20 PROCEDURE — 97535 SELF CARE MNGMENT TRAINING: CPT

## 2024-11-20 PROCEDURE — 6370000000 HC RX 637 (ALT 250 FOR IP): Performed by: INTERNAL MEDICINE

## 2024-11-20 PROCEDURE — 6360000002 HC RX W HCPCS: Performed by: INTERNAL MEDICINE

## 2024-11-20 PROCEDURE — 6370000000 HC RX 637 (ALT 250 FOR IP): Performed by: NURSE PRACTITIONER

## 2024-11-20 RX ORDER — MESALAMINE 4 G/60ML
4 SUSPENSION RECTAL NIGHTLY
Qty: 30 ENEMA | Refills: 0
Start: 2024-11-20

## 2024-11-20 RX ORDER — LORAZEPAM 0.5 MG/1
0.5 TABLET ORAL
Qty: 3 TABLET | Refills: 0 | Status: SHIPPED | OUTPATIENT
Start: 2024-11-20 | End: 2024-11-26

## 2024-11-20 RX ORDER — TRAZODONE HYDROCHLORIDE 50 MG/1
50 TABLET, FILM COATED ORAL NIGHTLY
Qty: 30 TABLET | Refills: 0
Start: 2024-11-20

## 2024-11-20 RX ADMIN — BUDESONIDE 9 MG: 3 CAPSULE, GELATIN COATED ORAL at 08:53

## 2024-11-20 RX ADMIN — PANTOPRAZOLE SODIUM 40 MG: 40 TABLET, DELAYED RELEASE ORAL at 06:39

## 2024-11-20 RX ADMIN — ENOXAPARIN SODIUM 40 MG: 100 INJECTION SUBCUTANEOUS at 08:53

## 2024-11-20 RX ADMIN — VENLAFAXINE HYDROCHLORIDE 75 MG: 37.5 CAPSULE, EXTENDED RELEASE ORAL at 08:53

## 2024-11-20 RX ADMIN — LEVOTHYROXINE SODIUM 88 MCG: 88 TABLET ORAL at 06:39

## 2024-11-20 RX ADMIN — Medication 1 TABLET: at 08:53

## 2024-11-20 RX ADMIN — DICYCLOMINE HYDROCHLORIDE 10 MG: 10 CAPSULE ORAL at 06:39

## 2024-11-20 RX ADMIN — DICYCLOMINE HYDROCHLORIDE 10 MG: 10 CAPSULE ORAL at 11:37

## 2024-11-20 RX ADMIN — LORAZEPAM 0.5 MG: 0.5 TABLET ORAL at 08:53

## 2024-11-20 RX ADMIN — MESALAMINE 500 MG: 250 CAPSULE ORAL at 08:55

## 2024-11-20 ASSESSMENT — PAIN SCALES - GENERAL
PAINLEVEL_OUTOF10: 0
PAINLEVEL_OUTOF10: 0

## 2024-11-20 NOTE — DISCHARGE INSTR - COC
24 COVID-19 & Influenza Combo   24 Infection                        Nurse Assessment:  Last Vital Signs: /67   Pulse 76   Temp 97.9 °F (36.6 °C) (Oral)   Resp 16   Ht 1.702 m (5' 7\")   Wt 60.7 kg (133 lb 12.8 oz)   SpO2 94%   BMI 20.96 kg/m²     Last documented pain score (0-10 scale): Pain Level: 0  Last Weight:   Wt Readings from Last 1 Encounters:   24 60.7 kg (133 lb 12.8 oz)     Mental Status:  oriented and alert    IV Access:  - None    Nursing Mobility/ADLs:  Walking   Assisted  Transfer  Assisted  Bathing  Assisted  Dressing  Assisted  Toileting  Independent  Feeding  Independent  Med Admin  Independent  Med Delivery   whole    Wound Care Documentation and Therapy:  Incision 18 Chest Anterior;Right;Upper (Active)   Number of days: 2401        Elimination:  Continence:   Bowel: Yes  Bladder: Yes  Urinary Catheter: None   Colostomy/Ileostomy/Ileal Conduit: No       Date of Last BM:   No intake or output data in the 24 hours ending 24 1311  I/O last 3 completed shifts:  In: 480 [P.O.:480]  Out: -     Safety Concerns:     At Risk for Falls    Impairments/Disabilities:      None    Nutrition Therapy:  Current Nutrition Therapy:   - Oral Diet:  General    Routes of Feeding: Oral  Liquids: Thin Liquids  Daily Fluid Restriction: no  Last Modified Barium Swallow with Video (Video Swallowing Test): not done    Treatments at the Time of Hospital Discharge:   Respiratory Treatments:   Oxygen Therapy:  is not on home oxygen therapy.  Ventilator:    - No ventilator support    Rehab Therapies: Physical Therapy and Occupational Therapy  Weight Bearing Status/Restrictions: No weight bearing restrictions  Other Medical Equipment (for information only, NOT a DME order):    Other Treatments:     Patient's personal belongings (please select all that are sent with patient):  All belongings sent with patient.    RN SIGNATURE:  Electronically signed by Lydia Zamora

## 2024-11-20 NOTE — PROGRESS NOTES
[facility] Medicine Progress Note  V 10.25      Date of Admission: 11/15/2024    Hospital Day: 5      Chief Admission Complaint:  Weakness, lightheadedness vertigo and diarrhea     Subjective: no new complaints    Presenting Admission History:       79 y.o. female with PMH significant for ulcerative colitis she follows with [facility] , depression and anxiety, hypothyroidism.  Also gives a history of lymphoma in 2018, she was treated with chemotherapy she informs me this has been in remission and follows yearly with [facility]/ Dr Lugo.     She presented to [facility] with c/o dizziness, lightheadedness and weakness.  She also states been having diarrhea and with an ulcerative colitis flare up.     She states lightheadedness dizziness been on for a few months presenting much worse over the past few days.  She feels the room spinning around her and has unsteady gait.  It is felt she is unsteady on her feet and she is at risk of falling and unsafe to go home .  Therefore she is admitted for further evaluation.     She denies any chest pain, shortness of breath.  No nausea vomiting.       Assessment/Plan:      Current Principal Problem:  Vertigo    Vertigo  - neurology consulted  - possibly due to polypharmacy  - stopped seroquel. Will stop gabapentin in two weeks if not resolved  - weaning off ativan  - PT/OT  - orthostatics currently negative    Ulcerative colitis  - GI consulted  - continue budesonide, mesalamine    Hypothyroidism  - continue synthroid    Anxiety/Depression  - mood stable  - stopped seroquel. Weaning off ativan  - continue effexor, trazodone      Consults:      IP CONSULT TO NEUROLOGY  IP CONSULT TO GI  IP CONSULT TO PHYSICAL MEDICINE REHAB      --------------------------------------------------      Medications:        Infusion Medications    sodium chloride       Scheduled Medications    LORazepam  0.5 mg Oral Daily    mesalamine  4 g Rectal Nightly    budesonide  9 mg Oral Daily    
  Hospital Medicine Progress Note  V 10.25      Date of Admission: 11/15/2024    Hospital Day: 2      Chief Admission Complaint:  Weakness, lightheadedness vertigo and diarrhea.     Subjective: She is sitting up in bed, states she slept well last night.  States she is having some dizziness lying in bed.  No shortness of breath or chest pain.     Presenting Admission History:       79 y.o. female with PMH significant for ulcerative colitis she follows with [facility], depression and anxiety, hypothyroidism.  Also gives a history of lymphoma in 2018, she was treated with chemotherapy she informs me this has been in remission and follows yearly with [facility]/ Dr Lugo.     She presented to [facility] with c/o dizziness, lightheadedness and weakness.  She also states been having diarrhea and with an ulcerative colitis flare up.     She states lightheadedness dizziness been on for a few months presenting much worse over the past few days.  She feels the room spinning around her and has unsteady gait.  It is felt she is unsteady on her feet and she is at risk of falling and unsafe to go home .  Therefore she is admitted for further evaluation.     She denies any chest pain, shortness of breath.  No nausea vomiting.    Assessment/Plan:      Current Principal Problem:  Vertigo    Vertigo, lightheadedness : She reports this is been ongoing for the past few months but getting worse over the past week.  Very unsteady on her feet and felt unsafe for her to go home as she is a very high fall risk.  Check a CT of the head, will follow orthostatic blood pressures.  Will IV fluid hydration.  Will get PT/OT to evaluate  Will also have neurology consultation for any further input  Have ordered meclizine to see if this helps.     Ulcerative colitis : She reports had a recent flare up.  She does follow with [facility]/Dr Hutchins.  She did have evaluated with a colonoscopy last week.  She was prescribed mesalamine and 
  Hospital Medicine Progress Note  V 10.25      Date of Admission: 11/15/2024    Hospital Day: 3      Chief Admission Complaint:  Weakness, lightheadedness vertigo and diarrhea     Subjective: She is sitting up in bed, she stated she slept very well during the night and overall is feeling little better.  States she did have some dizziness yesterday but thinks dizziness may be improving.  Denies chest pain or shortness of breath    Presenting Admission History:       79 y.o. female with PMH significant for ulcerative colitis she follows with [facility] , depression and anxiety, hypothyroidism.  Also gives a history of lymphoma in 2018, she was treated with chemotherapy she informs me this has been in remission and follows yearly with [facility]/ Dr Lugo.     She presented to [facility] with c/o dizziness, lightheadedness and weakness.  She also states been having diarrhea and with an ulcerative colitis flare up.     She states lightheadedness dizziness been on for a few months presenting much worse over the past few days.  She feels the room spinning around her and has unsteady gait.  It is felt she is unsteady on her feet and she is at risk of falling and unsafe to go home .  Therefore she is admitted for further evaluation.     She denies any chest pain, shortness of breath.  No nausea vomiting.       Assessment/Plan:      Current Principal Problem:  Vertigo    Vertigo, lightheadedness : She reports this iwas ongoing for the past few months but getting worse over the past week.  Very unsteady on her feet and felt unsafe for her to go home as she is a very high fall risk.  CT scan of the head reviewed no acute intracranial abnormalities noted.      PT/OT did evaluate and [facility].    Neurology consulted and input is appreciated.  Code DC'd per neurology.  If dizziness persists 2 weeks after stopping Seroquel then could try stopping gabapentin.  To follow-up with neurology clinic in 3 
  [facility] Medicine Progress Note  V 10.25      Date of Admission: 11/15/2024    Hospital Day: 4      Chief Admission Complaint:  Weakness, lightheadedness vertigo and diarrhea     Subjective: no new complaints    Presenting Admission History:       79 y.o. female with PMH significant for ulcerative colitis she follows with [facility] , depression and anxiety, hypothyroidism.  Also gives a history of lymphoma in 2018, she was treated with chemotherapy she informs me this has been in remission and follows yearly with [facility]/ Dr Lugo.     She presented to [facility] with c/o dizziness, lightheadedness and weakness.  She also states been having diarrhea and with an ulcerative colitis flare up.     She states lightheadedness dizziness been on for a few months presenting much worse over the past few days.  She feels the room spinning around her and has unsteady gait.  It is felt she is unsteady on her feet and she is at risk of falling and unsafe to go home .  Therefore she is admitted for further evaluation.     She denies any chest pain, shortness of breath.  No nausea vomiting.       Assessment/Plan:      Current Principal Problem:  Vertigo    Vertigo  - neurology consulted  - possibly due to polypharmacy  - stopped seroquel. Will stop gabapentin in two weeks if not resolved  - weaning of ativan  - PT/OT  - orthostatics currently negative    Ulcerative colitis  - GI consulted  - continue budesonide, mesalamine    Hypothyroidism  - continue synthroid    Anxiety/Depression  - mood stable  - stopped seroquel. Weaning off ativan  - continue effexor, trazodone      Consults:      IP CONSULT TO NEUROLOGY  IP CONSULT TO GI  IP CONSULT TO PHYSICAL MEDICINE REHAB      --------------------------------------------------      Medications:        Infusion Medications    sodium chloride       Scheduled Medications    LORazepam  0.5 mg Oral Daily    budesonide  9 mg Oral Daily    venlafaxine  75 mg Oral Daily with 
I reviewed chart and there is no documentation of orthostatic vital signs. Re-ordered orthostatic vital sign check.     Electronically signed by Todd Montgomery MD on 11/18/2024 at 7:47 AM    
Occupational Therapy  Facility/Department: [place] [id number] - MED SURG/ORTHO  Occupational Therapy Initial Assessment    Name: Morenita Tovar  : 1945  MRN: [id number]  Date of Service: 2024    Discharge Recommendations:  Subacute/Skilled Nursing Facility  Therapy discharge recommendations take into account each patient's current medical complexities and are subject to input/oversight from the patient's healthcare team.   Barriers to Home Discharge:   [x] Steps to access home entry or bed/bath:   [x] Unable to transfer, ambulate, or propel wheelchair household distances without assist   [x] Limited available assist at home upon discharge       If pt is unable to be seen after this session, please let this note serve as discharge summary.  Please see case management note for discharge disposition.  Thank you.           Patient Diagnosis(es): The primary encounter diagnosis was Diarrhea, unspecified type. A diagnosis of Dizziness was also pertinent to this visit.  Past Medical History:  has a past medical history of Anxiety, Female bladder prolapse, Heart murmur, Hypothyroid, Interstitial cystitis, Osteopenia, Panic anxiety syndrome, Scoliosis, Small cell B-cell lymphoma (HCC), and Ulcerative colitis (HCC).  Past Surgical History:  has a past surgical history that includes Cholecystectomy; Appendectomy; Tonsillectomy; Cataract removal (Right, 2016); Spinal fusion; Upper gastrointestinal endoscopy (2018); and Port Surgery (N/A, 2020).           Assessment  Performance deficits / Impairments: Decreased functional mobility ;Decreased endurance;Decreased ADL status;Decreased high-level IADLs;Decreased cognition;Decreased strength  Assessment: pt from home alone, normally independent with IADL's & functional mobility without AD; admitted for vertigo, weakness, now requiring CGA with functional transfers, bathroom mobility & reports dizziness with all activity with inability to tolerated OOB to chair, pt 
Orthostatic vital signs negative. No change to previous recommendations. Neurology signing off.   
Patient and family requested no interruptions during the night, patient has problems staying asleep. Primary nurse and PCT made aware  
Physical Therapy  Facility/Department: [facility] - MED SURG/ORTHO  Physical Therapy Initial Assessment    Name: Morenita Tovar  : 1945  MRN: [id number]  Date of Service: 2024    Discharge Recommendations:  Subacute/Skilled Nursing Facility   PT Equipment Recommendations  Equipment Needed: No  Other: defer to enxt level of care.      Therapy discharge recommendations take into account each patient's current medical complexities and are subject to input/oversight from the patient's healthcare team.   Barriers to Home Discharge:   [x] Steps to access home entry or bed/bath:   [] Unable to transfer, ambulate, or propel wheelchair household distances without assist   [x] Unable to perform ADLs without assist   [x] Limited available assist at home upon discharge - no 24 hour available.   [x] Patient or family requests d/c to post-acute facility    [] Poor cognition/safety awareness for d/c to home alone    [] Unable to maintain ordered weight bearing status    [] Patient with salient signs of long-standing immobility   [x] Other: pt at significantly increased risk for falls.      Patient Diagnosis(es): The primary encounter diagnosis was Vestibular migraine. Diagnoses of Diarrhea, unspecified type, Dizziness, and Persistent postural-perceptual dizziness were also pertinent to this visit.  Past Medical History:  has a past medical history of Anxiety, Female bladder prolapse, Heart murmur, Hypothyroid, Insomnia, Interstitial cystitis, Osteopenia, Panic anxiety syndrome, Scoliosis, Small cell B-cell lymphoma (HCC), and Ulcerative colitis (HCC).  Past Surgical History:  has a past surgical history that includes Cholecystectomy; Appendectomy; Tonsillectomy; Cataract removal (Right, 2016); Spinal fusion; Upper gastrointestinal endoscopy (2018); and Port Surgery (N/A, 2020).    Assessment  Assessment: Pt seen in conjunction with OT to maximize pt safety and mobility and safely assess pt maximal level of 
Physical Therapy  Facility/Department: [facility] C[facility] - MED SURG/ORTHO  Daily Treatment Note  NAME: Morenita Tovar  : 1945  MRN: [id number]    Date of Service: 2024    Discharge Recommendations:  24 hour supervision or assist, Home with Home health PT (vs SNF if no 24/7 assit)   PT Equipment Recommendations  Equipment Needed: No  Other: defer to enxt level of care.    Patient Diagnosis(es): The primary encounter diagnosis was Vestibular migraine. Diagnoses of Diarrhea, unspecified type, Dizziness, and Persistent postural-perceptual dizziness were also pertinent to this visit.    Assessment  Assessment: Pt completed mobility this date with CGA. Pt able to amb NAD close sba in room and hallway. Pt with no LOB during session, demo'ing improved activity tolerance. Changing d/c recs to SNF vs HHPT d/t pt progress and ability to have 24/7 SPV at home. Continue skilled PT while in acute stay.  Activity Tolerance: Patient tolerated treatment well  Equipment Needed: No    Plan  Physical Therapy Plan  General Plan: 3-5 times per week  Current Treatment Recommendations: Strengthening;Balance training;Functional mobility training;Transfer training;Endurance training;Gait training;Neuromuscular re-education;Safety education & training;Home exercise program;Patient/Caregiver education & training;Equipment evaluation, education, & procurement;Therapeutic activities    Restrictions  Restrictions/Precautions  Restrictions/Precautions: General Precautions, Fall Risk  Position Activity Restriction  Other position/activity restrictions: IV     Subjective   Subjective  Subjective: Pt agrees to PT session  Pain: 3/10 in head and abdomen  Orientation  Overall Orientation Status: Within Functional Limits  Orientation Level: Oriented X4  Cognition  Overall Cognitive Status: Exceptions  Arousal/Alertness: Appears intact  Following Commands: Follows all commands without difficulty  Attention Span: Difficulty attending to 
Pt has requested not to be woke up for vitals overnight.  
Pt lost IV access, Ky ROJAS notified and ok to not put another one in. Pt is also requesting no vitals during the night as she wants to sleep and does not want to be disturbed. Ky ROJAS ok with this.    
Pt sent with all belongings with [email or web address] transport. IV out.     Report called to [facility] given to nurse Mosqueda, all questions answered. Call back number given.  
Tolerance: Patient tolerated treatment well  Discharge Recommendations: Patient able to tolerate 3 hours of therapy per day;IP Rehab  Equipment Needed: No (defer to next level of care)     Plan  Occupational Therapy Plan  Times Per Week: 3-5x/ week  Current Treatment Recommendations: Strengthening;Balance training;Functional mobility training;Endurance training;Positioning;Self-Care / ADL;Safety education & training    Restrictions  Restrictions/Precautions  Restrictions/Precautions: General Precautions;Fall Risk  Position Activity Restriction  Other position/activity restrictions: IV    Subjective  Subjective  Subjective: Pt resting in chair upon OT arrival. Agreeable to session and motivated to participate in therapy  Pain: denies pain  Orientation  Overall Orientation Status: Within Functional Limits  Orientation Level: Oriented to place;Oriented to person;Oriented to situation;Oriented to time  Pain: denies pain at rest.  Cognition  Overall Cognitive Status: Exceptions  Arousal/Alertness: Appears intact  Following Commands: Follows one step commands with repetition  Attention Span: Difficulty attending to directions  Memory: Decreased short term memory  Safety Judgement: Decreased awareness of need for assistance  Insights: Decreased awareness of deficits  Initiation: Requires cues for some  Sequencing: Requires cues for some       Objective  Vitals  Vitals  Pulse: 73  Heart Rate Source: Monitor  SpO2: 95 %  O2 Device: None (Room air)  BP: (!) 145/78  MAP (Calculated): 100  BP Location: Left upper arm  BP Method: Automatic  Patient Position: Sitting;Up in chair    Bed Mobility Training  Bed Mobility Training: No (in chair at start and end of session)  Balance  Sitting: Intact  Standing: With support (RW)  Standing - Static: Constant support;Good  Standing - Dynamic: Constant support;Fair  Transfer Training  Transfer Training: Yes  Overall Level of Assistance: Contact-guard assistance;Additional time;Adaptive 
continued skilled OT services at this time.   Activity Tolerance: Patient tolerated treatment well  Discharge Recommendations: Subacute/Skilled Nursing Facility;Home with assist PRN  Factors Affecting Discharge: 24/7 SPV and HHOT vs SNF  Equipment Needed: No  Other: defer     Plan  Occupational Therapy Plan  Times Per Week: 3-5x/ week  Current Treatment Recommendations: Strengthening;Balance training;Functional mobility training;Endurance training;Positioning;Self-Care / ADL;Safety education & training    Restrictions  Restrictions/Precautions  Restrictions/Precautions: General Precautions;Fall Risk  Position Activity Restriction  Other position/activity restrictions: IV    Subjective  Subjective  Subjective: Pt supine in bed at start of session. Agreeable to OT tx. Denies pain  Orientation  Overall Orientation Status: Within Normal Limits  Orientation Level: Oriented X4          Objective  Vitals  Vitals  Pulse: 84  SpO2: 96 %  BP: 132/70  MAP (Calculated): 91  BP Location: Left upper arm  BP Method: Automatic  Patient Position: Semi fowlers  Bed Mobility Training  Bed Mobility Training: Yes  Supine to Sit: Modified independent  Sit to Supine: Modified independent  Balance  Sitting: Intact  Standing: High guard  Standing - Static: Good  Standing - Dynamic: Fair  Transfer Training  Transfer Training: Yes  Interventions: Safety awareness training;Verbal cues  Sit to Stand: Stand-by assistance  Stand to Sit: Stand-by assistance  Bed to Chair: Contact-guard assistance;Additional time (bed > toilet > sink side > bed > sink side > bed)  Toilet Transfer: Stand-by assistance (with grab bar use)     ADL  Grooming: Supervision;Increased time to complete  Grooming Skilled Clinical Factors: in stance at sink pt washed hands, brushed and flossed teeth  UE Dressing: Increased time to complete;Supervision  UE Dressing Skilled Clinical Factors: seated EOB doffed gown, donned tank top and tshirt  LE Dressing: Stand by 
Care;Equipment;Precautions;Fall Prevention Strategies;Transfer Training  Education Provided Comments: educated pt on benefits and performance of HEP, benefits of time spent OOB, safety in hopsital.  Education Method: Verbal  Barriers to Learning: Cognition  Education Outcome: Verbalized understanding;Continued education needed    AM-PAC - Mobility    AM-PAC Basic Mobility - Inpatient   How much help is needed turning from your back to your side while in a flat bed without using bedrails?: None  How much help is needed moving from lying on your back to sitting on the side of a flat bed without using bedrails?: None  How much help is needed moving to and from a bed to a chair?: A Little  How much help is needed standing up from a chair using your arms?: A Little  How much help is needed walking in hospital room?: A Little  How much help is needed climbing 3-5 steps with a railing?: A Lot  AM-PAC Inpatient Mobility Raw Score : 19  AM-PAC Inpatient T-Scale Score : 45.44  Mobility Inpatient CMS 0-100% Score: 41.77  Mobility Inpatient CMS G-Code Modifier : CK         Therapy Time   Individual Concurrent Group Co-treatment   Time In           Time Out           Minutes                   Magdi Bocanegra           
Strategies  Education Provided Comments: POC, d/c planning, bathroom DME, safe transfer techniques, building endurance  Education Method: Verbal  Barriers to Learning: Cognition;Hearing  Education Outcome: Verbalized understanding;Continued education needed;Demonstrated understanding  Disease Specific Education: Pt educated on importance of OOB mobility, prevention of complications of bedrest, and general safety during hospitalization. Pt verbalized understanding    Goals  Short Term Goals  Time Frame for Short Term Goals: 1 week(11-23-24) - all goals ongoing 11/19  Short Term Goal 1: Supervision with bathroom mobility with Least Restrictive Assistive Device by 11-23-24-  Short Term Goal 2: Supervision with standing ADL's for 5 minutes- MET 11/19  Short Term Goal 3: modified independent with functional/toilet transfers by 11-20-24- MET 11/19  Short Term Goal 4: tolerate 10-15 reps BUE exercises to increase strength for ADL's & transfers- MET 11/19  Patient Goals   Patient goals : \"go home\"    AM-PAC - ADL  AM-PAC Daily Activity - Inpatient   How much help is needed for putting on and taking off regular lower body clothing?: A Little  How much help is needed for bathing (which includes washing, rinsing, drying)?: A Little  How much help is needed for toileting (which includes using toilet, bedpan, or urinal)?: A Little  How much help is needed for putting on and taking off regular upper body clothing?: A Little  How much help is needed for taking care of personal grooming?: A Little  How much help for eating meals?: None  AM-PAC Inpatient Daily Activity Raw Score: 19  AM-PAC Inpatient ADL T-Scale Score : 40.22  ADL Inpatient CMS 0-100% Score: 42.8  ADL Inpatient CMS G-Code Modifier : CK    Therapy Time   Individual Concurrent Group Co-treatment   Time In 0955         Time Out 1050         Minutes 55         Timed Code Treatment Minutes: 55 Minutes       Karly Gray OT

## 2024-11-20 NOTE — PLAN OF CARE
Problem: Discharge Planning  Goal: Discharge to home or other facility with appropriate resources  11/15/2024 2251 by Demond Hill RN  Outcome: Progressing  11/15/2024 1845 by Toyin Ragland RN  Outcome: Progressing     Problem: Pain  Goal: Verbalizes/displays adequate comfort level or baseline comfort level  11/15/2024 2251 by Demond Hill RN  Outcome: Progressing  11/15/2024 1845 by Toyin Ragland RN  Outcome: Progressing     Problem: ABCDS Injury Assessment  Goal: Absence of physical injury  11/15/2024 2251 by Demond Hill RN  Outcome: Progressing  11/15/2024 1845 by Toyin Ragland RN  Outcome: Progressing     Problem: Neurosensory - Adult  Goal: Achieves stable or improved neurological status  11/15/2024 2251 by Demond Hill RN  Outcome: Progressing  11/15/2024 1845 by Toyin Ragland RN  Outcome: Progressing  Goal: Achieves maximal functionality and self care  11/15/2024 2251 by Demond Hill RN  Outcome: Progressing  11/15/2024 1845 by Toyin Ragland RN  Outcome: Progressing     Problem: Cardiovascular - Adult  Goal: Maintains optimal cardiac output and hemodynamic stability  11/15/2024 2251 by Demond Hill RN  Outcome: Progressing  11/15/2024 1845 by Toyin Ragland RN  Outcome: Progressing  Goal: Absence of cardiac dysrhythmias or at baseline  11/15/2024 2251 by Demond Hill RN  Outcome: Progressing  11/15/2024 1845 by Toyin Ragland RN  Outcome: Progressing     Problem: Musculoskeletal - Adult  Goal: Return mobility to safest level of function  11/15/2024 2251 by Demond Hill RN  Outcome: Progressing  11/15/2024 1845 by Toyin Ragland RN  Outcome: Progressing  Goal: Return ADL status to a safe level of function  11/15/2024 2251 by Demond Hill RN  Outcome: Progressing  11/15/2024 1845 by Toyin Ragland RN  Outcome: Progressing     Problem: Metabolic/Fluid and Electrolytes - Adult  Goal: Electrolytes maintained 
  Problem: Discharge Planning  Goal: Discharge to home or other facility with appropriate resources  11/20/2024 1101 by Lydia Zamora RN  Outcome: Progressing  11/20/2024 0255 by Cassidy Mi RN  Outcome: Progressing  Flowsheets (Taken 11/19/2024 2000)  Discharge to home or other facility with appropriate resources: Identify barriers to discharge with patient and caregiver     Problem: Pain  Goal: Verbalizes/displays adequate comfort level or baseline comfort level  11/20/2024 1101 by Lydia Zamora RN  Outcome: Progressing  11/20/2024 0255 by Cassidy Mi RN  Outcome: Progressing  Flowsheets (Taken 11/19/2024 2000)  Verbalizes/displays adequate comfort level or baseline comfort level: Assess pain using appropriate pain scale     Problem: ABCDS Injury Assessment  Goal: Absence of physical injury  11/20/2024 1101 by Lydia Zamora RN  Outcome: Progressing  11/20/2024 0255 by Cassidy Mi RN  Outcome: Progressing     Problem: Neurosensory - Adult  Goal: Achieves stable or improved neurological status  Outcome: Progressing  Goal: Achieves maximal functionality and self care  Outcome: Progressing     Problem: Cardiovascular - Adult  Goal: Maintains optimal cardiac output and hemodynamic stability  Outcome: Progressing  Goal: Absence of cardiac dysrhythmias or at baseline  Outcome: Progressing     Problem: Musculoskeletal - Adult  Goal: Return mobility to safest level of function  11/20/2024 1101 by Lydia Zamora RN  Outcome: Progressing  11/20/2024 0255 by Cassidy Mi RN  Outcome: Progressing  Flowsheets (Taken 11/19/2024 2000)  Return Mobility to Safest Level of Function: Assess patient stability and activity tolerance for standing, transferring and ambulating with or without assistive devices  Goal: Return ADL status to a safe level of function  Outcome: Progressing     Problem: Metabolic/Fluid and Electrolytes - Adult  Goal: Electrolytes maintained within normal 
  Problem: Discharge Planning  Goal: Discharge to home or other facility with appropriate resources  Outcome: Progressing     Problem: Pain  Goal: Verbalizes/displays adequate comfort level or baseline comfort level  Outcome: Progressing     Problem: ABCDS Injury Assessment  Goal: Absence of physical injury  Outcome: Progressing     Problem: Neurosensory - Adult  Goal: Achieves stable or improved neurological status  Outcome: Progressing  Goal: Achieves maximal functionality and self care  Outcome: Progressing     Problem: Cardiovascular - Adult  Goal: Maintains optimal cardiac output and hemodynamic stability  Outcome: Progressing  Goal: Absence of cardiac dysrhythmias or at baseline  Outcome: Progressing     Problem: Musculoskeletal - Adult  Goal: Return mobility to safest level of function  Outcome: Progressing  Goal: Return ADL status to a safe level of function  Outcome: Progressing     Problem: Metabolic/Fluid and Electrolytes - Adult  Goal: Electrolytes maintained within normal limits  Outcome: Progressing  Goal: Hemodynamic stability and optimal renal function maintained  Outcome: Progressing     Problem: Hematologic - Adult  Goal: Maintains hematologic stability  Outcome: Progressing     Problem: Safety - Adult  Goal: Free from fall injury  11/17/2024 0515 by Nadia Rojas, RN  Outcome: Progressing  11/16/2024 1723 by Claudia Pedroza RN  Outcome: Progressing     
  Problem: Discharge Planning  Goal: Discharge to home or other facility with appropriate resources  Outcome: Progressing     Problem: Pain  Goal: Verbalizes/displays adequate comfort level or baseline comfort level  Outcome: Progressing     Problem: ABCDS Injury Assessment  Goal: Absence of physical injury  Outcome: Progressing     Problem: Neurosensory - Adult  Goal: Achieves stable or improved neurological status  Outcome: Progressing  Goal: Achieves maximal functionality and self care  Outcome: Progressing     Problem: Cardiovascular - Adult  Goal: Maintains optimal cardiac output and hemodynamic stability  Outcome: Progressing  Goal: Absence of cardiac dysrhythmias or at baseline  Outcome: Progressing     Problem: Musculoskeletal - Adult  Goal: Return mobility to safest level of function  Outcome: Progressing  Goal: Return ADL status to a safe level of function  Outcome: Progressing     Problem: Metabolic/Fluid and Electrolytes - Adult  Goal: Electrolytes maintained within normal limits  Outcome: Progressing  Goal: Hemodynamic stability and optimal renal function maintained  Outcome: Progressing     Problem: Hematologic - Adult  Goal: Maintains hematologic stability  Outcome: Progressing     Problem: Safety - Adult  Goal: Free from fall injury  Outcome: Progressing     
  Problem: Discharge Planning  Goal: Discharge to home or other facility with appropriate resources  Outcome: Progressing  Flowsheets (Taken 11/19/2024 2000)  Discharge to home or other facility with appropriate resources: Identify barriers to discharge with patient and caregiver     Problem: Pain  Goal: Verbalizes/displays adequate comfort level or baseline comfort level  Outcome: Progressing  Flowsheets (Taken 11/19/2024 2000)  Verbalizes/displays adequate comfort level or baseline comfort level: Assess pain using appropriate pain scale     Problem: ABCDS Injury Assessment  Goal: Absence of physical injury  Outcome: Progressing     Problem: Musculoskeletal - Adult  Goal: Return mobility to safest level of function  Outcome: Progressing  Flowsheets (Taken 11/19/2024 2000)  Return Mobility to Safest Level of Function: Assess patient stability and activity tolerance for standing, transferring and ambulating with or without assistive devices     Problem: Metabolic/Fluid and Electrolytes - Adult  Goal: Electrolytes maintained within normal limits  Outcome: Progressing     Problem: Safety - Adult  Goal: Free from fall injury  Outcome: Progressing     
  Problem: Safety - Adult  Goal: Free from fall injury  Outcome: Progressing     
Increase function to baseline.    
Pt assessed using the NIHS scale with score of 0, reassessed Q 4 hours, no changes or new deficits at this time. Vital signs stable and reassessed Q4 hours. Telemetry monitor continued. Fall precautions in place. Call light within reach. Educated pt on importance of calling out when getting out of bed for staff assistance as needed or other needs, pt verbalizes understanding. Care of plan ongoing.     Problem: Neurosensory - Adult  Goal: Achieves stable or improved neurological status  Outcome: Progressing  Goal: Achieves maximal functionality and self care  Outcome: Progressing     Problem: Cardiovascular - Adult  Goal: Maintains optimal cardiac output and hemodynamic stability  Outcome: Progressing  Goal: Absence of cardiac dysrhythmias or at baseline  Outcome: Progressing     Problem: Musculoskeletal - Adult  Goal: Return mobility to safest level of function  Outcome: Progressing  Goal: Return ADL status to a safe level of function  Outcome: Progressing     Problem: Metabolic/Fluid and Electrolytes - Adult  Goal: Electrolytes maintained within normal limits  Outcome: Progressing  Goal: Hemodynamic stability and optimal renal function maintained  Outcome: Progressing     Problem: Hematologic - Adult  Goal: Maintains hematologic stability  Outcome: Progressing       Pt resting in bed quietly. Bed in lowest position, wheels locked, side rails up X2, non skid socks on. Bed check alarm engaged. Pt instructed not to get out of bed on own, to use call light for staff assistance when ambulating or other needs. Pt verbalizes understanding. Call light within reach. Care of plan ongoing.     Problem: Musculoskeletal - Adult  Goal: Return mobility to safest level of function  Outcome: Progressing  Goal: Return ADL status to a safe level of function  Outcome: Progressing     Problem: Neurosensory - Adult  Goal: Achieves maximal functionality and self care  Outcome: Progressing     Problem: ABCDS Injury Assessment  Goal: 
Pt assessed using the NIHS scale with score of 0, reassessed per policy/protocol/order, no changes or new deficits at this time. Vital signs stable and reassessed Q4 hours. Telemetry monitor continued. Fall precautions in place. Call light within reach. Educated pt on importance of calling out when getting out of bed for staff assistance as needed or other needs, pt verbalizes understanding. Care of plan ongoing.     Problem: Neurosensory - Adult  Goal: Achieves stable or improved neurological status  Outcome: Progressing  Goal: Achieves maximal functionality and self care  Outcome: Progressing     Problem: Cardiovascular - Adult  Goal: Maintains optimal cardiac output and hemodynamic stability  Outcome: Progressing  Goal: Absence of cardiac dysrhythmias or at baseline  Outcome: Progressing     Problem: Musculoskeletal - Adult  Goal: Return mobility to safest level of function  Outcome: Progressing  Goal: Return ADL status to a safe level of function  Outcome: Progressing     Problem: Metabolic/Fluid and Electrolytes - Adult  Goal: Electrolytes maintained within normal limits  Outcome: Progressing  Goal: Hemodynamic stability and optimal renal function maintained  Outcome: Progressing     Problem: Hematologic - Adult  Goal: Maintains hematologic stability  Outcome: Progressing       Pt resting in bed quietly. Bed in lowest position, wheels locked, side rails up X2, non skid socks on. Bed check alarm engaged. Pt instructed not to get out of bed on own, to use call light for staff assistance when ambulating or other needs. Pt verbalizes understanding. Call light within reach. Care of plan ongoing.     Problem: ABCDS Injury Assessment  Goal: Absence of physical injury  Outcome: Progressing     Problem: Safety - Adult  Goal: Free from fall injury  Outcome: Progressing     
Received ED page for admission.  Sent to Dr. So admitting hospitalist.     
TODAY'S DATE:  11/18/2024      Current NIHSS 0      Discussed personal risk factors for Stroke/TIA with patient/family, and ways to reduce the risk for a recurrent stroke.     Patient's personal risk factors which were identified are:   []   Alcohol Abuse: check with your physician before any alcohol consumption.   []   Atrial fibrillation: may cause blood clots  []   Drug Abuse: Seek help, talk with your doctor  []   Clotting Disorder  []   Diabetes  [x]   Family history of stroke or heart disease  [x]   High Blood Pressure/Hypertension: work with your physician  [x]   High cholesterol: monitor cholesterol levels with your physician  []   Overweight/Obesity: work with your physician for your ideal body weight  [x]   Physical Inactivity: get regular exercise as directed by your physician  [x]   Personal history of previous TIA or stroke  []   Poor Diet: decrease salt (sodium) in your diet, follow diet directed by physician  []   Smoking: stop smoking      Reviewed the Following Education with Patient and/or Family:   - Signs and Symptoms of Stroke  - Personal risk factors   - How to activate EMS (911)   - Importance of Follow Up Appointments at Discharge   - Importance of Compliance with Medications Prescribed at Discharge  - Available community resources and stroke advocacy groups if needed    Patient and/or family member: verbalized understanding.     Stroke Education booklet given to patient/family (or verified, if given already), which reviews above information. yes         Electronically signed by Toyin Ragland RN on 11/18/2024 at 11:28 AM        
TODAY'S DATE:  11/18/2024      Current NIHSS 0      Discussed personal risk factors for Stroke/TIA with patient/family, and ways to reduce the risk for a recurrent stroke.     Patient's personal risk factors which were identified are:   []   Alcohol Abuse: check with your physician before any alcohol consumption.   []   Atrial fibrillation: may cause blood clots  []   Drug Abuse: Seek help, talk with your doctor  []   Clotting Disorder  []   Diabetes  [x]   Family history of stroke or heart disease  [x]   High Blood Pressure/Hypertension: work with your physician  [x]   High cholesterol: monitor cholesterol levels with your physician  []   Overweight/Obesity: work with your physician for your ideal body weight  [x]   Physical Inactivity: get regular exercise as directed by your physician  [x]   Personal history of previous TIA or stroke  []   Poor Diet: decrease salt (sodium) in your diet, follow diet directed by physician  []   Smoking: stop smoking      Reviewed the Following Education with Patient and/or Family:   - Signs and Symptoms of Stroke  - Personal risk factors   - How to activate EMS (911)   - Importance of Follow Up Appointments at Discharge   - Importance of Compliance with Medications Prescribed at Discharge  - Available community resources and stroke advocacy groups if needed    Patient and/or family member: verbalized understanding.     Stroke Education booklet given to patient/family (or verified, if given already), which reviews above information. yes         Electronically signed by Toyin Ragland RN on 11/18/2024 at 11:29 AM        
UE There ex, Bed mobility, Transfers, ADL training, Adaptative equipment training, Therapeutic activity, Neuromuscular re-education, Patient education   
within normal limits  11/19/2024 1102 by Lydia Zamora RN  Outcome: Progressing  11/19/2024 0559 by Triny Vinson RN  Outcome: Progressing  Goal: Hemodynamic stability and optimal renal function maintained  11/19/2024 1102 by Lydia Zaomra RN  Outcome: Progressing  11/19/2024 0559 by Triny Vinson RN  Outcome: Progressing     Problem: Hematologic - Adult  Goal: Maintains hematologic stability  11/19/2024 1102 by Lydia Zamora RN  Outcome: Progressing  11/19/2024 0559 by Triny Vinson RN  Outcome: Progressing     Problem: Safety - Adult  Goal: Free from fall injury  11/19/2024 1102 by Lydia Zamora RN  Outcome: Progressing  11/19/2024 0559 by Triny Vinson RN  Outcome: Progressing

## 2024-11-20 NOTE — CARE COORDINATION
Referral pending to [facility], follow up call placed this am. No cert needed. Hopeful for d/c today. Electronically signed by DANIELA GUAJARDO RN on 11/20/2024 at 8:18 AM

## 2024-11-20 NOTE — CARE COORDINATION
CASE MANAGEMENT DISCHARGE SUMMARY      Discharge to: [facility];SNF    Precertification completed: n/a  Hospital Exemption Notification (HENS) completed: yes    IMM given: 11/20/24    New Durable Medical Equipment ordered/agency: defer    Transportation:    Medical Transport explained to pt/family. Pt/family voice no agency preference.      Confirmed discharge plan with:     Patient: yes     Family:  dtr at bedside      Facility/Agency, name:  Confirmed w/[facility] at [facility]   Phone number for report to facility: [phone]      RN, name: Lydia     Note: Discharging nurse to complete YARITZA, reconcile AVS, and place final copy with patient's discharge packet. RN to ensure that written prescriptions for  Level II medications are sent with patient to the facility as per protocol.

## 2024-11-20 NOTE — DISCHARGE SUMMARY
Hospital Medicine Discharge Summary    Patient: Morenita Tovar   : 1945     Admit Date: 11/15/2024   Discharge Date: 2024    Disposition:  []Home   []HHC  [x]SNF  []Acute Rehab  []LTAC  []Hospice  Code status:  [x]Full  []DNR/CCA  []Limited (DNR/CCA with Do Not Intubate)  []DNRCC  Condition at Discharge: Stable  Primary Care Provider: Stephanie Ramírez, APRN - CNP    Admitting Provider: Elvia So MD  Discharge Provider: González Welsh MD     Discharge Diagnoses:      Active Hospital Problems    Diagnosis     Vertigo [R42]        Presenting Admission History:      79 y.o. female with PMH significant for ulcerative colitis she follows with [facility] , depression and anxiety, hypothyroidism.  Also gives a history of lymphoma in 2018, she was treated with chemotherapy she informs me this has been in remission and follows yearly with [facility]/ Dr Lugo.     She presented to [facility] with c/o dizziness, lightheadedness and weakness.  She also states been having diarrhea and with an ulcerative colitis flare up.     She states lightheadedness dizziness been on for a few months presenting much worse over the past few days.  She feels the room spinning around her and has unsteady gait.  It is felt she is unsteady on her feet and she is at risk of falling and unsafe to go home .  Therefore she is admitted for further evaluation.     She denies any chest pain, shortness of breath.  No nausea vomiting.     Assessment/Plan:      Vertigo  - neurology consulted  - possibly due to polypharmacy  - stopped seroquel. Will stop gabapentin in two weeks if not resolved  - weaning off ativan  - PT/OT  - orthostatics currently negative     Ulcerative colitis  - GI consulted  - continue budesonide, mesalamine     Hypothyroidism  - continue synthroid     Anxiety/Depression  - mood stable  - stopped seroquel. Weaning off ativan  - continue effexor, trazodone    Physical Exam Performed:      /70   Pulse 84

## 2025-06-13 ENCOUNTER — TRANSCRIBE ORDERS (OUTPATIENT)
Facility: HOSPITAL | Age: 80
End: 2025-06-13

## 2025-06-13 DIAGNOSIS — R29.898 RIGHT LEG WEAKNESS: ICD-10-CM

## 2025-06-13 DIAGNOSIS — R26.9 ABNORMALITY OF GAIT: ICD-10-CM

## 2025-06-13 DIAGNOSIS — R25.8 CLONUS: Primary | ICD-10-CM

## 2025-06-13 DIAGNOSIS — I63.9 CEREBELLAR INFARCTION (HCC): ICD-10-CM

## 2025-06-13 DIAGNOSIS — R26.9 ALTERED GAIT: ICD-10-CM

## 2025-06-16 ENCOUNTER — HOSPITAL ENCOUNTER (OUTPATIENT)
Dept: MRI IMAGING | Age: 80
Discharge: HOME | End: 2025-06-16
Payer: MEDICARE

## 2025-06-16 DIAGNOSIS — R26.9 ABNORMALITY OF GAIT: ICD-10-CM

## 2025-06-16 DIAGNOSIS — R25.8 CLONUS: ICD-10-CM

## 2025-06-16 DIAGNOSIS — R29.898 RIGHT LEG WEAKNESS: ICD-10-CM

## 2025-06-16 DIAGNOSIS — R26.9 ALTERED GAIT: ICD-10-CM

## 2025-06-16 DIAGNOSIS — I63.9 CEREBELLAR INFARCTION (HCC): ICD-10-CM

## 2025-06-16 PROCEDURE — 6360000004 HC RX CONTRAST MEDICATION: Performed by: NURSE PRACTITIONER

## 2025-06-16 PROCEDURE — 72141 MRI NECK SPINE W/O DYE: CPT

## 2025-06-16 PROCEDURE — 70553 MRI BRAIN STEM W/O & W/DYE: CPT

## 2025-06-16 PROCEDURE — A9579 GAD-BASE MR CONTRAST NOS,1ML: HCPCS | Performed by: NURSE PRACTITIONER

## 2025-06-16 RX ORDER — GADOTERIDOL 279.3 MG/ML
12 INJECTION INTRAVENOUS
Status: COMPLETED | OUTPATIENT
Start: 2025-06-16 | End: 2025-06-16

## 2025-06-16 RX ADMIN — GADOTERIDOL 12 ML: 279.3 INJECTION, SOLUTION INTRAVENOUS at 11:40

## 2025-08-07 ENCOUNTER — TRANSCRIBE ORDERS (OUTPATIENT)
Dept: ADMINISTRATIVE | Age: 80
End: 2025-08-07

## 2025-08-07 DIAGNOSIS — R68.81 EARLY SATIETY: Primary | ICD-10-CM

## 2025-09-04 ENCOUNTER — HOSPITAL ENCOUNTER (OUTPATIENT)
Dept: NUCLEAR MEDICINE | Age: 80
Discharge: HOME | End: 2025-09-04
Payer: MEDICARE

## 2025-09-04 DIAGNOSIS — R68.81 EARLY SATIETY: ICD-10-CM

## 2025-09-04 PROCEDURE — A9541 TC99M SULFUR COLLOID: HCPCS

## 2025-09-04 PROCEDURE — 3430000000 HC RX DIAGNOSTIC RADIOPHARMACEUTICAL

## 2025-09-04 PROCEDURE — 78264 GASTRIC EMPTYING IMG STUDY: CPT

## 2025-09-04 RX ADMIN — Medication 1.1 MILLICURIE: at 11:10

## (undated) DEVICE — SUTURE MCRYL + SZ 4-0 L18IN ABSRB UD L19MM PS-2 3/8 CIR MCP496G

## (undated) DEVICE — DRAPE,T,LAPARO,TRANS,STERILE: Brand: MEDLINE

## (undated) DEVICE — CHLORAPREP 26ML ORANGE

## (undated) DEVICE — SOLUTION IV IRRIG POUR BRL 0.9% SODIUM CHL 2F7124

## (undated) DEVICE — PENCIL ES L3M ROCK SWCH S STL HEX LOK BLDE ELECTRD HOLSTER

## (undated) DEVICE — GOWN,AURORA,NONREINFORCED,LARGE: Brand: MEDLINE

## (undated) DEVICE — MINOR SET UP: Brand: MEDLINE INDUSTRIES, INC.

## (undated) DEVICE — SUTURE VCRL + SZ 3-0 L18IN ABSRB UD SH 1/2 CIR TAPERCUT NDL VCP864D